# Patient Record
Sex: MALE | Race: OTHER | NOT HISPANIC OR LATINO | URBAN - METROPOLITAN AREA
[De-identification: names, ages, dates, MRNs, and addresses within clinical notes are randomized per-mention and may not be internally consistent; named-entity substitution may affect disease eponyms.]

---

## 2021-07-04 ENCOUNTER — INPATIENT (INPATIENT)
Facility: HOSPITAL | Age: 41
LOS: 3 days | Discharge: HOME | DRG: 520 | End: 2021-07-08
Attending: NEUROLOGICAL SURGERY | Admitting: NEUROLOGICAL SURGERY
Payer: COMMERCIAL

## 2021-07-04 VITALS
TEMPERATURE: 98 F | HEIGHT: 74 IN | RESPIRATION RATE: 18 BRPM | SYSTOLIC BLOOD PRESSURE: 139 MMHG | WEIGHT: 149.91 LBS | OXYGEN SATURATION: 98 % | HEART RATE: 83 BPM | DIASTOLIC BLOOD PRESSURE: 85 MMHG

## 2021-07-04 DIAGNOSIS — R63.8 OTHER SYMPTOMS AND SIGNS CONCERNING FOOD AND FLUID INTAKE: ICD-10-CM

## 2021-07-04 DIAGNOSIS — F32.9 MAJOR DEPRESSIVE DISORDER, SINGLE EPISODE, UNSPECIFIED: ICD-10-CM

## 2021-07-04 DIAGNOSIS — E03.9 HYPOTHYROIDISM, UNSPECIFIED: ICD-10-CM

## 2021-07-04 DIAGNOSIS — G43.909 MIGRAINE, UNSPECIFIED, NOT INTRACTABLE, WITHOUT STATUS MIGRAINOSUS: ICD-10-CM

## 2021-07-04 DIAGNOSIS — M79.606 PAIN IN LEG, UNSPECIFIED: ICD-10-CM

## 2021-07-04 LAB
ALBUMIN SERPL ELPH-MCNC: 4 G/DL — SIGNIFICANT CHANGE UP (ref 3.4–5)
ALP SERPL-CCNC: 57 U/L — SIGNIFICANT CHANGE UP (ref 40–120)
ALT FLD-CCNC: 18 U/L — SIGNIFICANT CHANGE UP (ref 12–42)
ANION GAP SERPL CALC-SCNC: 11 MMOL/L — SIGNIFICANT CHANGE UP (ref 9–16)
AST SERPL-CCNC: 15 U/L — SIGNIFICANT CHANGE UP (ref 15–37)
BASOPHILS # BLD AUTO: 0.05 K/UL — SIGNIFICANT CHANGE UP (ref 0–0.2)
BASOPHILS NFR BLD AUTO: 0.8 % — SIGNIFICANT CHANGE UP (ref 0–2)
BILIRUB SERPL-MCNC: 0.8 MG/DL — SIGNIFICANT CHANGE UP (ref 0.2–1.2)
BUN SERPL-MCNC: 19 MG/DL — SIGNIFICANT CHANGE UP (ref 7–23)
CALCIUM SERPL-MCNC: 9.2 MG/DL — SIGNIFICANT CHANGE UP (ref 8.5–10.5)
CHLORIDE SERPL-SCNC: 105 MMOL/L — SIGNIFICANT CHANGE UP (ref 96–108)
CO2 SERPL-SCNC: 25 MMOL/L — SIGNIFICANT CHANGE UP (ref 22–31)
CREAT SERPL-MCNC: 1.09 MG/DL — SIGNIFICANT CHANGE UP (ref 0.5–1.3)
EOSINOPHIL # BLD AUTO: 0.19 K/UL — SIGNIFICANT CHANGE UP (ref 0–0.5)
EOSINOPHIL NFR BLD AUTO: 3 % — SIGNIFICANT CHANGE UP (ref 0–6)
GLUCOSE SERPL-MCNC: 93 MG/DL — SIGNIFICANT CHANGE UP (ref 70–99)
HCT VFR BLD CALC: 42.5 % — SIGNIFICANT CHANGE UP (ref 39–50)
HGB BLD-MCNC: 14.9 G/DL — SIGNIFICANT CHANGE UP (ref 13–17)
IMM GRANULOCYTES NFR BLD AUTO: 0.3 % — SIGNIFICANT CHANGE UP (ref 0–1.5)
LYMPHOCYTES # BLD AUTO: 2.24 K/UL — SIGNIFICANT CHANGE UP (ref 1–3.3)
LYMPHOCYTES # BLD AUTO: 35.2 % — SIGNIFICANT CHANGE UP (ref 13–44)
MCHC RBC-ENTMCNC: 29.4 PG — SIGNIFICANT CHANGE UP (ref 27–34)
MCHC RBC-ENTMCNC: 35.1 GM/DL — SIGNIFICANT CHANGE UP (ref 32–36)
MCV RBC AUTO: 84 FL — SIGNIFICANT CHANGE UP (ref 80–100)
MONOCYTES # BLD AUTO: 0.47 K/UL — SIGNIFICANT CHANGE UP (ref 0–0.9)
MONOCYTES NFR BLD AUTO: 7.4 % — SIGNIFICANT CHANGE UP (ref 2–14)
NEUTROPHILS # BLD AUTO: 3.39 K/UL — SIGNIFICANT CHANGE UP (ref 1.8–7.4)
NEUTROPHILS NFR BLD AUTO: 53.3 % — SIGNIFICANT CHANGE UP (ref 43–77)
NRBC # BLD: 0 /100 WBCS — SIGNIFICANT CHANGE UP (ref 0–0)
PLATELET # BLD AUTO: 284 K/UL — SIGNIFICANT CHANGE UP (ref 150–400)
POTASSIUM SERPL-MCNC: 4.2 MMOL/L — SIGNIFICANT CHANGE UP (ref 3.5–5.3)
POTASSIUM SERPL-SCNC: 4.2 MMOL/L — SIGNIFICANT CHANGE UP (ref 3.5–5.3)
PROT SERPL-MCNC: 7.4 G/DL — SIGNIFICANT CHANGE UP (ref 6.4–8.2)
RBC # BLD: 5.06 M/UL — SIGNIFICANT CHANGE UP (ref 4.2–5.8)
RBC # FLD: 12.1 % — SIGNIFICANT CHANGE UP (ref 10.3–14.5)
SARS-COV-2 RNA SPEC QL NAA+PROBE: SIGNIFICANT CHANGE UP
SODIUM SERPL-SCNC: 141 MMOL/L — SIGNIFICANT CHANGE UP (ref 132–145)
WBC # BLD: 6.36 K/UL — SIGNIFICANT CHANGE UP (ref 3.8–10.5)
WBC # FLD AUTO: 6.36 K/UL — SIGNIFICANT CHANGE UP (ref 3.8–10.5)

## 2021-07-04 PROCEDURE — 72131 CT LUMBAR SPINE W/O DYE: CPT | Mod: 26

## 2021-07-04 PROCEDURE — 99223 1ST HOSP IP/OBS HIGH 75: CPT | Mod: GC

## 2021-07-04 PROCEDURE — 99285 EMERGENCY DEPT VISIT HI MDM: CPT

## 2021-07-04 PROCEDURE — 72192 CT PELVIS W/O DYE: CPT | Mod: 26

## 2021-07-04 PROCEDURE — 93010 ELECTROCARDIOGRAM REPORT: CPT

## 2021-07-04 RX ORDER — KETOROLAC TROMETHAMINE 30 MG/ML
15 SYRINGE (ML) INJECTION ONCE
Refills: 0 | Status: DISCONTINUED | OUTPATIENT
Start: 2021-07-04 | End: 2021-07-04

## 2021-07-04 RX ORDER — SUMATRIPTAN SUCCINATE 4 MG/.5ML
6 INJECTION, SOLUTION SUBCUTANEOUS ONCE
Refills: 0 | Status: COMPLETED | OUTPATIENT
Start: 2021-07-04 | End: 2021-07-04

## 2021-07-04 RX ORDER — LEVOTHYROXINE SODIUM 125 MCG
1 TABLET ORAL
Qty: 0 | Refills: 0 | DISCHARGE

## 2021-07-04 RX ORDER — DIAZEPAM 5 MG
5 TABLET ORAL ONCE
Refills: 0 | Status: DISCONTINUED | OUTPATIENT
Start: 2021-07-04 | End: 2021-07-04

## 2021-07-04 RX ORDER — DEXAMETHASONE 0.5 MG/5ML
10 ELIXIR ORAL ONCE
Refills: 0 | Status: COMPLETED | OUTPATIENT
Start: 2021-07-04 | End: 2021-07-04

## 2021-07-04 RX ORDER — TRAMADOL HYDROCHLORIDE 50 MG/1
75 TABLET ORAL EVERY 6 HOURS
Refills: 0 | Status: DISCONTINUED | OUTPATIENT
Start: 2021-07-04 | End: 2021-07-05

## 2021-07-04 RX ORDER — LEVOTHYROXINE SODIUM 125 MCG
50 TABLET ORAL DAILY
Refills: 0 | Status: DISCONTINUED | OUTPATIENT
Start: 2021-07-04 | End: 2021-07-08

## 2021-07-04 RX ORDER — IBUPROFEN 200 MG
600 TABLET ORAL EVERY 6 HOURS
Refills: 0 | Status: DISCONTINUED | OUTPATIENT
Start: 2021-07-04 | End: 2021-07-04

## 2021-07-04 RX ORDER — METHOCARBAMOL 500 MG/1
1500 TABLET, FILM COATED ORAL ONCE
Refills: 0 | Status: COMPLETED | OUTPATIENT
Start: 2021-07-04 | End: 2021-07-04

## 2021-07-04 RX ORDER — OXYCODONE AND ACETAMINOPHEN 5; 325 MG/1; MG/1
1 TABLET ORAL ONCE
Refills: 0 | Status: DISCONTINUED | OUTPATIENT
Start: 2021-07-04 | End: 2021-07-04

## 2021-07-04 RX ORDER — BUPROPION HYDROCHLORIDE 150 MG/1
1 TABLET, EXTENDED RELEASE ORAL
Qty: 0 | Refills: 0 | DISCHARGE

## 2021-07-04 RX ORDER — BUPROPION HYDROCHLORIDE 150 MG/1
150 TABLET, EXTENDED RELEASE ORAL DAILY
Refills: 0 | Status: DISCONTINUED | OUTPATIENT
Start: 2021-07-04 | End: 2021-07-08

## 2021-07-04 RX ORDER — ACETAMINOPHEN 500 MG
650 TABLET ORAL EVERY 4 HOURS
Refills: 0 | Status: DISCONTINUED | OUTPATIENT
Start: 2021-07-04 | End: 2021-07-06

## 2021-07-04 RX ORDER — SUMATRIPTAN SUCCINATE 4 MG/.5ML
1 INJECTION, SOLUTION SUBCUTANEOUS
Qty: 0 | Refills: 0 | DISCHARGE

## 2021-07-04 RX ADMIN — OXYCODONE AND ACETAMINOPHEN 1 TABLET(S): 5; 325 TABLET ORAL at 06:00

## 2021-07-04 RX ADMIN — SUMATRIPTAN SUCCINATE 6 MILLIGRAM(S): 4 INJECTION, SOLUTION SUBCUTANEOUS at 23:25

## 2021-07-04 RX ADMIN — Medication 15 MILLIGRAM(S): at 02:45

## 2021-07-04 RX ADMIN — Medication 5 MILLIGRAM(S): at 02:45

## 2021-07-04 RX ADMIN — Medication 102 MILLIGRAM(S): at 09:36

## 2021-07-04 RX ADMIN — METHOCARBAMOL 1500 MILLIGRAM(S): 500 TABLET, FILM COATED ORAL at 02:45

## 2021-07-04 RX ADMIN — TRAMADOL HYDROCHLORIDE 75 MILLIGRAM(S): 50 TABLET ORAL at 21:03

## 2021-07-04 RX ADMIN — TRAMADOL HYDROCHLORIDE 75 MILLIGRAM(S): 50 TABLET ORAL at 22:00

## 2021-07-04 RX ADMIN — Medication 15 MILLIGRAM(S): at 06:00

## 2021-07-04 NOTE — H&P ADULT - NSHPPHYSICALEXAM_GEN_ALL_CORE
PHYSICAL EXAM:    General: WDWN  HEENT: NC/AT; PERRL, anicteric sclera; MMM  Neck: supple  Cardiovascular: +S1/S2, RRR  Respiratory: CTA B/L; no W/R/R  Gastrointestinal: soft, NT/ND; +BSx4  Extremities: WWP; no edema, clubbing or cyanosis  Vascular: 2+ radial, DP/PT pulses B/L  Neurological: AAOx3; no focal deficits  Psychiatric: pleasant mood and affect  Dermatologic: no appreciable wounds or damage to the skin PHYSICAL EXAM:    General: thin male, olaying in bed nAD  HEENT: NC/AT; PERRL, anicteric sclera; MMM  Neck: supple, no palpable nodules, thyromegaly not appreciated  Cardiovascular: +S1/S2, RRR  Respiratory: CTA B/L; no W/R/R  Gastrointestinal: soft, NT/ND; +BSx4  Extremities: WWP; no edema, clubbing or cyanosis  Vascular: 2+ radial, DP/PT pulses B/L  Neurological: AAOx3; no focal deficits. + straight leg test R LE  Psychiatric: pleasant mood and affect  Dermatologic: no appreciable wounds or damage to the skin

## 2021-07-04 NOTE — ED PROVIDER NOTE - OBJECTIVE STATEMENT
40 yom pw right buttock pain rad to posterior thigh/calf x 2-3 days.  hx of similar pain, was told "sciatica", no recent flare ups.  was moving furniture days prior, no trauma/fall/instrumentation.  no urinary sx, no paresthesia to groin, no weakness to LE.  no prior spinal surgery.

## 2021-07-04 NOTE — H&P ADULT - NSHPSOCIALHISTORY_GEN_ALL_CORE
Work:  Tobacco use:   EtOH use:  Illicit drug use:    Living situation: Tobacco use: x  EtOH use: social 1-2/wk  Illicit drug use:x

## 2021-07-04 NOTE — ED PROVIDER NOTE - PROGRESS NOTE DETAILS
Samantha Scales MD  Patient is more comfortable with lying down on his left side, but is unable to move from this position and is unable to stand or walk.  Motor and sensory remain intact.  no abd tenderness.  moderate tenderness right buttock in area of sciatic notch.  Will add steroids, but patient will need admission for further evaluation and care.  Will call transfer center

## 2021-07-04 NOTE — H&P ADULT - PROBLEM SELECTOR PLAN 1
pt w/ 2 days of acute onset RLE pain w/ + straight leg test likely 2/2 to sciatica. Motor and sensory remain intact. pt denies urinary or GI sx, no paresthesia to groin, no weakness to LE. no prior back or spinal surgeries.   -s/p Ketoralac 15mg ivp x2, 5mg percocet x1, robaxin 1500mg x1, valium 5mg iv, decadron 10mg iv  -c/w tramadol 75mg q6 prn severe, ibuprofen 150mg q6 prn moderate , tylenol 650mg q4 prn mild  -cont to monitor pain and for red flag signs  -PT consult pt w/ 2 days of acute onset RLE pain w/ + straight leg test likely 2/2 to sciatica. Motor and sensory remain intact. pt denies urinary or GI sx, no paresthesia to groin, no weakness to LE. no prior back or spinal surgeries.   -s/p Ketoralac 15mg ivp x2, 5mg percocet x1, robaxin 1500mg x1, valium 5mg iv, decadron 10mg iv  -c/w tramadol 75mg q6 prn severe, ibuprofen 150mg q6 prn moderate , tylenol 650mg q4 prn mild  -cont to monitor pain and for red flag signs  -f/u CT lumbar w.o contrast  -PT consult

## 2021-07-04 NOTE — H&P ADULT - NSHPLABSRESULTS_GEN_ALL_CORE
.  LABS:                         14.9   6.36  )-----------( 284      ( 04 Jul 2021 02:51 )             42.5     07-04    141  |  105  |  19  ----------------------------<  93  4.2   |  25  |  1.09    Ca    9.2      04 Jul 2021 02:51    TPro  7.4  /  Alb  4.0  /  TBili  0.8  /  DBili  x   /  AST  15  /  ALT  18  /  AlkPhos  57  07-04              RADIOLOGY, EKG & ADDITIONAL TESTS: Reviewed.

## 2021-07-04 NOTE — H&P ADULT - ASSESSMENT
pt is a 41yo M w/ pmhx of hypothyroidism (on 50mcg daily levothyroxine), depression (on buproprion 300mg qd), migraines (sumatriptan prn) presenting with 2 days of sharp constant 9/10 R gluteal pain likely 2/2 to sciatica A 41yo M w/ pmhx of hypothyroidism (on 50mcg daily levothyroxine), depression (on buproprion 300mg qd), migraines (sumatriptan prn) presenting with 2 days of sharp constant 9/10 R gluteal pain likely 2/2 to sciatica

## 2021-07-04 NOTE — ED ADULT NURSE NOTE - CHIEF COMPLAINT QUOTE
Pt BIBA for R sciatica pain. Pt D/C from hospital yesterday for pain control. Pt states he is unable to walk today.

## 2021-07-04 NOTE — H&P ADULT - ATTENDING COMMENTS
41yo M w/ PMH of hypothyroidism (on 50mcg daily levothyroxine), depression (on buproprion 300mg qd), migraines (sumatriptan prn) presenting with 2 days of sharp constant 9/10 R gluteal pain radiating down to right calf after lifting. CT Lumbar/Pelvis w/o fracture, +L5/S1 mild disc herniation, no spinal stenosis. Pain likely MSK related 39yo M w/ PMH of hypothyroidism (on 50mcg daily levothyroxine), depression (on buproprion 300mg qd), migraines (sumatriptan prn) presenting with 2 days of sharp constant 9/10 R gluteal pain, intermittently radiating down to right knee after lifting. CT Lumbar/Pelvis wet read w/o fracture, +L5/S1 mild chronic disc herniation, no spinal stenosis.     #R Gluteal pain w/Sciatica: Pain localized on my exam to right buttock/piriformis area. Negative straight leg; pain localized to one area. Denies numbness/tingling, bowel/bladder incontinence. Denies lower back pain, trauma. CT Lumbar spine/Pelvis wet read w/o fracture, +L5/S1 mild chronic disc herniation, no spinal stenosis. Pain likely 2/2 muscle strain/sprain vs piriformis syndrome vs less likely 2/2 herniated disc. s/p decadron 10mg x1 in ED. can c/w medrol dose pack although data limited. c/w pain control; ISTOP in AM; PT eval. 41yo M w/ PMH of hypothyroidism (on 50mcg daily levothyroxine), depression (on buproprion 300mg qd), migraines (sumatriptan prn) presenting with 2 days of sharp constant 9/10 R gluteal pain, intermittently radiating down to right knee after lifting. CT Lumbar/Pelvis wet read w/o fracture, +L5/S1 mild disc herniation, awaiting final read.     #R Gluteal pain w/Sciatica: Pain localized on my exam to right buttock/piriformis area. Negative straight leg; pain localized to one area. Denies numbness/tingling, bowel/bladder incontinence. Denies lower back pain, trauma. CT Lumbar spine/Pelvis wet read w/o fracture, +L5/S1 mild disc herniation, awaiting final read. Pain likely 2/2 muscle strain/sprain vs 2/2 herniated disc vs piriformis syndrome. s/p decadrom in ED, c/w medrol dose pack. c/w pain control; ISTOP in AM; PT eval.

## 2021-07-04 NOTE — ED PROVIDER NOTE - CLINICAL SUMMARY MEDICAL DECISION MAKING FREE TEXT BOX
normal strength, sensation intact, normal rectal tone, no point tenderness to lumbar spine, +tenderness over gluteus satya, will trial of analgesia

## 2021-07-04 NOTE — ED PROVIDER NOTE - PHYSICAL EXAMINATION
Physical Exam  GEN: Awake, alert, non-toxic appearing, NCAT  EYES: full EOMI,  ENT: External inspection normal, normal voice,   HEAD: atraumatic  NECK: FROM neck, supple,   RESP: no tachypnea, no hypoxia, no resp distress,  MSK: no deformity, tenderness to gluteus satya  SKIN: no rash  NEURO: ao x 3, strength 5/5 in R hip/knee/ankle/ehl, normal rectal tone, sensation intact in perineum

## 2021-07-04 NOTE — H&P ADULT - PROBLEM SELECTOR PLAN 5
Fluids: none  Electrolytes: Mg>2, K>4  Nutrition:  No IVF currently needed, replete lytes PRN  Prophylaxis: none  Activity: AAT, OOBTC  GI: none  C: FC  Dispo: Admit to [facility]

## 2021-07-04 NOTE — H&P ADULT - HISTORY OF PRESENT ILLNESS
HPI:     In the ED:  Initial vital signs: T: XX F, HR: XX, BP: XX, R: XX, SpO2: XX% on RA  ED course:   Labs: significant for  Imaging:  CXR:   EKG:   Medications:   Consults: none      HPI: pt is a 41yo M w/ pmhx of hypothyroidism (on 50mcg daily levothyroxine), depression (on buproprion 300mg qd), migraines (sumatriptan prn) presenting with 2 days of sharp constant 9/10 R gluteal pain worse w/ ambulation, improved w rest associated with intermittent numbness on the R lateral thigh.  Patient states hes been seeing a chiropractor and physical therapist for general health maintenance was moving furniture days prior, no trauma/fall/instrumentation. Motor and sensory remain intact. pt denies urinary or GI sx, no paresthesia to groin, no weakness to LE. no prior back or spinal surgeries.     ROS otherwise negative    In the ED:  Initial vital signs: T: 97.8F, HR: 83, BP: 139, R: 18, SpO2: 98% on RA  ED course:   Labs: cbc, bmp unremarkable   CXR: none  EKG: none  Medications: Ketoralac 15mg ivp x2, 5mg percocet x1, robaxin 1500mg x1, valium 5mg iv, decadron 10mg iv   Consults: none      HPI: pt is a 39yo M w/ pmhx of hypothyroidism (on 50mcg daily levothyroxine), depression (on buproprion 300mg qd), migraines (sumatriptan prn) presenting with 2 days of sharp constant 9/10 R gluteal pain worse w/ ambulation, improved w rest associated with intermittent numbness on the R lateral thigh.  Patient states hes been seeing a chiropractor and physical therapist for general health maintenance was moving furniture days prior, no trauma/fall/instrumentation. Motor and sensory remain intact. pt denies urinary or GI sx, no paresthesia to groin, no weakness to LE. no prior back or spinal surgeries.     ROS otherwise negative    In the ED:  Initial vital signs: T: 97.8F, HR: 83, BP: 139, R: 18, SpO2: 98% on RA  Labs: cbc, bmp unremarkable   Medications: Ketoralac 15mg ivp x2, 5mg percocet x1, robaxin 1500mg x1, valium 5mg iv, decadron 10mg iv   Consults: none

## 2021-07-04 NOTE — ED ADULT TRIAGE NOTE - CHIEF COMPLAINT QUOTE
Pt BIBA for R sciatica pain. Pt D/C from hospital yesterday for pain control. Pt states he is unable to walk today. patient pain improved. instructed to f.u pmd, med prescribed

## 2021-07-04 NOTE — ED ADULT NURSE REASSESSMENT NOTE - NS ED NURSE REASSESS COMMENT FT1
Pt complaining of R buttock pain 3/10 when lying down and 9/10 when walking. PT states that when he stood up he had a shooting pain to R buttock and was unable to walk. Pt updated on plan of care. Pending further evaluation.
Pt resting in bed at this time. Pending admission. Will continue to monitor.
Received Pt from previous RN lying on stretcher awake and alert, breathing without issue on RA. IV site is patent. Awaiting dispo.

## 2021-07-05 LAB
ALBUMIN SERPL ELPH-MCNC: 4.4 G/DL — SIGNIFICANT CHANGE UP (ref 3.3–5)
ALP SERPL-CCNC: 54 U/L — SIGNIFICANT CHANGE UP (ref 40–120)
ALT FLD-CCNC: 11 U/L — SIGNIFICANT CHANGE UP (ref 10–45)
ANION GAP SERPL CALC-SCNC: 10 MMOL/L — SIGNIFICANT CHANGE UP (ref 5–17)
APTT BLD: 33.3 SEC — SIGNIFICANT CHANGE UP (ref 27.5–35.5)
AST SERPL-CCNC: 14 U/L — SIGNIFICANT CHANGE UP (ref 10–40)
BASOPHILS # BLD AUTO: 0.06 K/UL — SIGNIFICANT CHANGE UP (ref 0–0.2)
BASOPHILS NFR BLD AUTO: 0.6 % — SIGNIFICANT CHANGE UP (ref 0–2)
BILIRUB SERPL-MCNC: 0.7 MG/DL — SIGNIFICANT CHANGE UP (ref 0.2–1.2)
BLD GP AB SCN SERPL QL: NEGATIVE — SIGNIFICANT CHANGE UP
BUN SERPL-MCNC: 19 MG/DL — SIGNIFICANT CHANGE UP (ref 7–23)
CALCIUM SERPL-MCNC: 9.5 MG/DL — SIGNIFICANT CHANGE UP (ref 8.4–10.5)
CHLORIDE SERPL-SCNC: 103 MMOL/L — SIGNIFICANT CHANGE UP (ref 96–108)
CO2 SERPL-SCNC: 27 MMOL/L — SIGNIFICANT CHANGE UP (ref 22–31)
CREAT SERPL-MCNC: 1.05 MG/DL — SIGNIFICANT CHANGE UP (ref 0.5–1.3)
EOSINOPHIL # BLD AUTO: 0.09 K/UL — SIGNIFICANT CHANGE UP (ref 0–0.5)
EOSINOPHIL NFR BLD AUTO: 0.9 % — SIGNIFICANT CHANGE UP (ref 0–6)
GLUCOSE SERPL-MCNC: 108 MG/DL — HIGH (ref 70–99)
HCT VFR BLD CALC: 41.7 % — SIGNIFICANT CHANGE UP (ref 39–50)
HGB BLD-MCNC: 14.8 G/DL — SIGNIFICANT CHANGE UP (ref 13–17)
HIV 1+2 AB+HIV1 P24 AG SERPL QL IA: SIGNIFICANT CHANGE UP
IMM GRANULOCYTES NFR BLD AUTO: 0.4 % — SIGNIFICANT CHANGE UP (ref 0–1.5)
INR BLD: 1.04 — SIGNIFICANT CHANGE UP (ref 0.88–1.16)
LYMPHOCYTES # BLD AUTO: 3.05 K/UL — SIGNIFICANT CHANGE UP (ref 1–3.3)
LYMPHOCYTES # BLD AUTO: 30.1 % — SIGNIFICANT CHANGE UP (ref 13–44)
MAGNESIUM SERPL-MCNC: 1.8 MG/DL — SIGNIFICANT CHANGE UP (ref 1.6–2.6)
MCHC RBC-ENTMCNC: 29 PG — SIGNIFICANT CHANGE UP (ref 27–34)
MCHC RBC-ENTMCNC: 35.5 GM/DL — SIGNIFICANT CHANGE UP (ref 32–36)
MCV RBC AUTO: 81.6 FL — SIGNIFICANT CHANGE UP (ref 80–100)
MONOCYTES # BLD AUTO: 0.55 K/UL — SIGNIFICANT CHANGE UP (ref 0–0.9)
MONOCYTES NFR BLD AUTO: 5.4 % — SIGNIFICANT CHANGE UP (ref 2–14)
NEUTROPHILS # BLD AUTO: 6.34 K/UL — SIGNIFICANT CHANGE UP (ref 1.8–7.4)
NEUTROPHILS NFR BLD AUTO: 62.6 % — SIGNIFICANT CHANGE UP (ref 43–77)
NRBC # BLD: 0 /100 WBCS — SIGNIFICANT CHANGE UP (ref 0–0)
PHOSPHATE SERPL-MCNC: 2.9 MG/DL — SIGNIFICANT CHANGE UP (ref 2.5–4.5)
PLATELET # BLD AUTO: 292 K/UL — SIGNIFICANT CHANGE UP (ref 150–400)
POTASSIUM SERPL-MCNC: 4.5 MMOL/L — SIGNIFICANT CHANGE UP (ref 3.5–5.3)
POTASSIUM SERPL-SCNC: 4.5 MMOL/L — SIGNIFICANT CHANGE UP (ref 3.5–5.3)
PROT SERPL-MCNC: 7.6 G/DL — SIGNIFICANT CHANGE UP (ref 6–8.3)
PROTHROM AB SERPL-ACNC: 12.5 SEC — SIGNIFICANT CHANGE UP (ref 10.6–13.6)
RBC # BLD: 5.11 M/UL — SIGNIFICANT CHANGE UP (ref 4.2–5.8)
RBC # FLD: 12 % — SIGNIFICANT CHANGE UP (ref 10.3–14.5)
RH IG SCN BLD-IMP: POSITIVE — SIGNIFICANT CHANGE UP
SODIUM SERPL-SCNC: 140 MMOL/L — SIGNIFICANT CHANGE UP (ref 135–145)
TSH SERPL-MCNC: 5.8 UIU/ML — HIGH (ref 0.27–4.2)
WBC # BLD: 10.13 K/UL — SIGNIFICANT CHANGE UP (ref 3.8–10.5)
WBC # FLD AUTO: 10.13 K/UL — SIGNIFICANT CHANGE UP (ref 3.8–10.5)

## 2021-07-05 PROCEDURE — 99221 1ST HOSP IP/OBS SF/LOW 40: CPT

## 2021-07-05 PROCEDURE — 93010 ELECTROCARDIOGRAM REPORT: CPT

## 2021-07-05 PROCEDURE — 72158 MRI LUMBAR SPINE W/O & W/DYE: CPT | Mod: 26

## 2021-07-05 PROCEDURE — 99232 SBSQ HOSP IP/OBS MODERATE 35: CPT

## 2021-07-05 RX ORDER — MORPHINE SULFATE 50 MG/1
2 CAPSULE, EXTENDED RELEASE ORAL ONCE
Refills: 0 | Status: DISCONTINUED | OUTPATIENT
Start: 2021-07-05 | End: 2021-07-05

## 2021-07-05 RX ORDER — DEXAMETHASONE 0.5 MG/5ML
10 ELIXIR ORAL ONCE
Refills: 0 | Status: COMPLETED | OUTPATIENT
Start: 2021-07-05 | End: 2021-07-05

## 2021-07-05 RX ORDER — DIAZEPAM 5 MG
5 TABLET ORAL ONCE
Refills: 0 | Status: DISCONTINUED | OUTPATIENT
Start: 2021-07-05 | End: 2021-07-05

## 2021-07-05 RX ORDER — SENNA PLUS 8.6 MG/1
1 TABLET ORAL DAILY
Refills: 0 | Status: DISCONTINUED | OUTPATIENT
Start: 2021-07-05 | End: 2021-07-07

## 2021-07-05 RX ORDER — POLYETHYLENE GLYCOL 3350 17 G/17G
17 POWDER, FOR SOLUTION ORAL DAILY
Refills: 0 | Status: DISCONTINUED | OUTPATIENT
Start: 2021-07-05 | End: 2021-07-08

## 2021-07-05 RX ORDER — MORPHINE SULFATE 50 MG/1
2 CAPSULE, EXTENDED RELEASE ORAL EVERY 6 HOURS
Refills: 0 | Status: DISCONTINUED | OUTPATIENT
Start: 2021-07-05 | End: 2021-07-06

## 2021-07-05 RX ORDER — TRAMADOL HYDROCHLORIDE 50 MG/1
75 TABLET ORAL EVERY 6 HOURS
Refills: 0 | Status: DISCONTINUED | OUTPATIENT
Start: 2021-07-05 | End: 2021-07-06

## 2021-07-05 RX ORDER — CYCLOBENZAPRINE HYDROCHLORIDE 10 MG/1
5 TABLET, FILM COATED ORAL THREE TIMES A DAY
Refills: 0 | Status: DISCONTINUED | OUTPATIENT
Start: 2021-07-05 | End: 2021-07-06

## 2021-07-05 RX ADMIN — TRAMADOL HYDROCHLORIDE 75 MILLIGRAM(S): 50 TABLET ORAL at 17:20

## 2021-07-05 RX ADMIN — BUPROPION HYDROCHLORIDE 150 MILLIGRAM(S): 150 TABLET, EXTENDED RELEASE ORAL at 08:57

## 2021-07-05 RX ADMIN — POLYETHYLENE GLYCOL 3350 17 GRAM(S): 17 POWDER, FOR SOLUTION ORAL at 11:00

## 2021-07-05 RX ADMIN — CYCLOBENZAPRINE HYDROCHLORIDE 5 MILLIGRAM(S): 10 TABLET, FILM COATED ORAL at 06:45

## 2021-07-05 RX ADMIN — Medication 102 MILLIGRAM(S): at 17:53

## 2021-07-05 RX ADMIN — MORPHINE SULFATE 2 MILLIGRAM(S): 50 CAPSULE, EXTENDED RELEASE ORAL at 13:45

## 2021-07-05 RX ADMIN — TRAMADOL HYDROCHLORIDE 75 MILLIGRAM(S): 50 TABLET ORAL at 12:03

## 2021-07-05 RX ADMIN — TRAMADOL HYDROCHLORIDE 75 MILLIGRAM(S): 50 TABLET ORAL at 16:42

## 2021-07-05 RX ADMIN — TRAMADOL HYDROCHLORIDE 75 MILLIGRAM(S): 50 TABLET ORAL at 06:20

## 2021-07-05 RX ADMIN — MORPHINE SULFATE 2 MILLIGRAM(S): 50 CAPSULE, EXTENDED RELEASE ORAL at 13:28

## 2021-07-05 RX ADMIN — MORPHINE SULFATE 2 MILLIGRAM(S): 50 CAPSULE, EXTENDED RELEASE ORAL at 15:29

## 2021-07-05 RX ADMIN — Medication 5 MILLIGRAM(S): at 13:53

## 2021-07-05 RX ADMIN — Medication 50 MICROGRAM(S): at 05:08

## 2021-07-05 RX ADMIN — TRAMADOL HYDROCHLORIDE 75 MILLIGRAM(S): 50 TABLET ORAL at 11:01

## 2021-07-05 RX ADMIN — SENNA PLUS 1 TABLET(S): 8.6 TABLET ORAL at 11:01

## 2021-07-05 RX ADMIN — SUMATRIPTAN SUCCINATE 6 MILLIGRAM(S): 4 INJECTION, SOLUTION SUBCUTANEOUS at 00:15

## 2021-07-05 RX ADMIN — TRAMADOL HYDROCHLORIDE 75 MILLIGRAM(S): 50 TABLET ORAL at 05:08

## 2021-07-05 RX ADMIN — MORPHINE SULFATE 2 MILLIGRAM(S): 50 CAPSULE, EXTENDED RELEASE ORAL at 13:54

## 2021-07-05 NOTE — PHYSICAL THERAPY INITIAL EVALUATION ADULT - PERTINENT HX OF CURRENT PROBLEM, REHAB EVAL
HPI: pt is a 41yo M w/ pmhx of hypothyroidism (on 50mcg daily levothyroxine), depression (on buproprion 300mg qd), migraines (sumatriptan prn) presenting with 2 days of sharp constant 9/10 R gluteal pain worse w/ ambulation, improved w rest associated with intermittent numbness on the R lateral thigh.  pt denies urinary or GI sx, no paresthesia to groin, no weakness to LE. no prior back or spinal surgeries. HPI: pt is a 41yo M w/ pmhx of hypothyroidism,  depression, , migraines  presenting with 2 days of sharp constant 9/10 R gluteal pain worse w/ ambulation, improved w rest associated with intermittent numbness on the R lateral thigh.  pt denies urinary or GI sx, no paresthesia to groin, no weakness to LE. no prior back or spinal surgeries.  At the L5/S1 level, there is disc bulge   minimum disc bulges at the L1/2 through the L4/5

## 2021-07-05 NOTE — PROGRESS NOTE ADULT - SUBJECTIVE AND OBJECTIVE BOX
INTERVAL HPI/OVERNIGHT EVENTS:      VITAL SIGNS:  T(F): 98.1 (07-05-21 @ 06:25)  HR: 66 (07-05-21 @ 06:25)  BP: 104/63 (07-05-21 @ 06:25)  RR: 18 (07-05-21 @ 06:25)  SpO2: 98% (07-05-21 @ 06:25)  Wt(kg): --    PHYSICAL EXAM:      MEDICATIONS  (STANDING):  buPROPion XL (24-Hour) . 150 milliGRAM(s) Oral daily  levothyroxine 50 MICROGram(s) Oral daily  methylPREDNISolone   Oral   methylPREDNISolone 24 milliGRAM(s) Oral once  polyethylene glycol 3350 17 Gram(s) Oral daily  senna 1 Tablet(s) Oral daily    MEDICATIONS  (PRN):  acetaminophen   Tablet .. 650 milliGRAM(s) Oral every 4 hours PRN Mild Pain (1 - 3)  cyclobenzaprine 5 milliGRAM(s) Oral three times a day PRN Muscle Spasm  morphine  - Injectable 2 milliGRAM(s) IV Push every 6 hours PRN Severe Pain (7 - 10)  traMADol 75 milliGRAM(s) Oral every 6 hours PRN Moderate Pain (4 - 6)      Allergies    No Known Allergies    Intolerances        LABS:                        14.9   6.36  )-----------( 284      ( 04 Jul 2021 02:51 )             42.5     07-04    141  |  105  |  19  ----------------------------<  93  4.2   |  25  |  1.09    Ca    9.2      04 Jul 2021 02:51    TPro  7.4  /  Alb  4.0  /  TBili  0.8  /  DBili  x   /  AST  15  /  ALT  18  /  AlkPhos  57  07-04            RADIOLOGY & ADDITIONAL TESTS:  Reviewed INTERVAL HPI/OVERNIGHT EVENTS:    Patient reporting severe 10/10 R gluteal pain that radiates to upper thigh with numbness of upper lateral thigh. Pain started two days ago and progressively worsening, worse when walks, most relieved with sitting.  Patient denies n/v, cp/sb, abdominal discomfort, dyuria,.      VITAL SIGNS:  T(F): 98.1 (07-05-21 @ 06:25)  HR: 66 (07-05-21 @ 06:25)  BP: 104/63 (07-05-21 @ 06:25)  RR: 18 (07-05-21 @ 06:25)  SpO2: 98% (07-05-21 @ 06:25)  Wt(kg): --    PHYSICAL EXAM:      MEDICATIONS  (STANDING):  buPROPion XL (24-Hour) . 150 milliGRAM(s) Oral daily  levothyroxine 50 MICROGram(s) Oral daily  methylPREDNISolone   Oral   methylPREDNISolone 24 milliGRAM(s) Oral once  polyethylene glycol 3350 17 Gram(s) Oral daily  senna 1 Tablet(s) Oral daily    MEDICATIONS  (PRN):  acetaminophen   Tablet .. 650 milliGRAM(s) Oral every 4 hours PRN Mild Pain (1 - 3)  cyclobenzaprine 5 milliGRAM(s) Oral three times a day PRN Muscle Spasm  morphine  - Injectable 2 milliGRAM(s) IV Push every 6 hours PRN Severe Pain (7 - 10)  traMADol 75 milliGRAM(s) Oral every 6 hours PRN Moderate Pain (4 - 6)      Allergies    No Known Allergies    Intolerances        LABS:                        14.9   6.36  )-----------( 284      ( 04 Jul 2021 02:51 )             42.5     07-04    141  |  105  |  19  ----------------------------<  93  4.2   |  25  |  1.09    Ca    9.2      04 Jul 2021 02:51    TPro  7.4  /  Alb  4.0  /  TBili  0.8  /  DBili  x   /  AST  15  /  ALT  18  /  AlkPhos  57  07-04            RADIOLOGY & ADDITIONAL TESTS:  Reviewed INTERVAL HPI/OVERNIGHT EVENTS:    Patient reporting severe 10/10 R gluteal pain that radiates to upper thigh with numbness of upper lateral thigh. Pain started two days ago and progressively worsening, worse when walks, most relieved with sitting.  Patient denies n/v, cp/sb, abdominal discomfort, dyuria,.      VITAL SIGNS:  T(F): 98.1 (07-05-21 @ 06:25)  HR: 66 (07-05-21 @ 06:25)  BP: 104/63 (07-05-21 @ 06:25)  RR: 18 (07-05-21 @ 06:25)  SpO2: 98% (07-05-21 @ 06:25)  Wt(kg): --    PHYSICAL EXAM:  General: sitting in bed, appears to be in mild distress  HEENT: sclera non-icteric, neck supple, trachea midline, no masses, no JVD, MMM  Respiratory: CTA b/l, no wheezing, no rhonchi, no rales, without accessory muscle use and no intercostal retractions  Cardiovascular: RRR, normal S1S2, no M/R/G  Gastrointestinal: soft, NTND, no masses palpable, BS normal  Extremities: Warm, well perfused, pulses equal bilateral upper and lower extremities, no edema, no clubbing. Right LE SL sign positive with pain elicited on rasing leg, nomal range of motion, sensation reduced right lateral upper thigh compared to left, strength 4/5 right leg compared to 5/5 left  Neurological: AAOx3  Skin: Normal temperature, warm, dry    MEDICATIONS  (STANDING):  buPROPion XL (24-Hour) . 150 milliGRAM(s) Oral daily  levothyroxine 50 MICROGram(s) Oral daily  methylPREDNISolone   Oral   methylPREDNISolone 24 milliGRAM(s) Oral once  polyethylene glycol 3350 17 Gram(s) Oral daily  senna 1 Tablet(s) Oral daily    MEDICATIONS  (PRN):  acetaminophen   Tablet .. 650 milliGRAM(s) Oral every 4 hours PRN Mild Pain (1 - 3)  cyclobenzaprine 5 milliGRAM(s) Oral three times a day PRN Muscle Spasm  morphine  - Injectable 2 milliGRAM(s) IV Push every 6 hours PRN Severe Pain (7 - 10)  traMADol 75 milliGRAM(s) Oral every 6 hours PRN Moderate Pain (4 - 6)      Allergies    No Known Allergies    Intolerances        LABS:                        14.9   6.36  )-----------( 284      ( 04 Jul 2021 02:51 )             42.5     07-04    141  |  105  |  19  ----------------------------<  93  4.2   |  25  |  1.09    Ca    9.2      04 Jul 2021 02:51    TPro  7.4  /  Alb  4.0  /  TBili  0.8  /  DBili  x   /  AST  15  /  ALT  18  /  AlkPhos  57  07-04            RADIOLOGY & ADDITIONAL TESTS:  Reviewed

## 2021-07-05 NOTE — PROGRESS NOTE ADULT - ATTENDING COMMENTS
MRI shows Moderate size right paracentral disc herniation at L5/S1 compressing the right S1 nerve root   High dose steroids started  Neurosurgery consulted  Pain management

## 2021-07-05 NOTE — PROGRESS NOTE ADULT - PROBLEM SELECTOR PLAN 1
pt w/ 2 days of acute onset RLE pain w/ + straight leg test likely 2/2 to sciatica. Motor and sensory remain intact. pt denies urinary or GI sx, no paresthesia to groin, no weakness to LE. no prior back or spinal surgeries. CT pelvis and lumbas spine without contrast shows L3-L4, L4-L5, L5-S1: There is disc bulge resulting in minimal spinal canal stenosis and mild bilateral neural foraminal narrowing.  -s/p Ketoralac 15mg ivp x2, 5mg percocet x1, robaxin 1500mg x1, valium 5mg iv, decadron 10mg iv  -c/w tramadol 75mg q6 prn severe, ibuprofen 150mg q6 prn moderate , tylenol 650mg q4 prn mild, patient in severe pain given 4mg morphine and valium, robaxin  -cont to monitor pain and for red flag signs  - FU MRI  - Patient getting methylpred taper  -PT recommends home with home PT but patient continues to be in severe pain, will continue to work up pain pt w/ 2 days of acute onset RLE pain w/ + straight leg test likely 2/2 to sciatica. Motor and sensory remain intact. pt denies urinary or GI sx, no paresthesia to groin, no weakness to LE. no prior back or spinal surgeries. CT pelvis and lumbas spine without contrast shows L3-L4, L4-L5, L5-S1: There is disc bulge resulting in minimal spinal canal stenosis and mild bilateral neural foraminal narrowing.  -s/p Ketoralac 15mg ivp x2, 5mg percocet x1, robaxin 1500mg x1, valium 5mg iv, decadron 10mg iv  -c/w tramadol 75mg q6 prn severe, ibuprofen 150mg q6 prn moderate , tylenol 650mg q4 prn mild, patient in severe pain given 4mg morphine and valium, robaxin  -cont to monitor pain and for red flag signs  - MRI shows Moderate size right paracentral disc herniation at L5/S1 compressing the right S1 nerve root   - High dose steroids started  -PT recommends home with home PT but patient continues to be in severe pain, will continue to work up pain

## 2021-07-05 NOTE — PROGRESS NOTE ADULT - PROBLEM SELECTOR PLAN 4
-c/w buproprion 150mg qd (increase back to home dose (300mg QD) -c/w buproprion 150mg qd (increase back to home dose (300mg QD)    #Pre-op clearance  Patient found to have S1 nerve root compression on MRI. [facility] consulted. s/p 10mg IV decadron. likely will need surgical intervention. Pt w/ METS >4, RCRI Class I. Patient deemed low risk for intermediate risk procedure.  EKG with...

## 2021-07-05 NOTE — CONSULT NOTE ADULT - SUBJECTIVE AND OBJECTIVE BOX
HISTORY OF PRESENT ILLNESS:   41 y/o male with pmh hypothyroidism and depression presents with severe right gluteal pain radiating down his RLE x 3 days. Patient reports that he has had intermittent radiating pain from b/l buttocks into his lower extremities with certain activities since childhood, but it has worsened over the past 2-3 years. Patient reports that he was moving heavy boxes 2 days ago when symptoms in the RLE acutely worsened. He also admits to numbness to posterior R thigh.  Patient was given 10mg decadron, valium, robaxin and toradol at [facility] ER with some relief, CT showed S1 disc bulging, and he was transferred to [facility] for workup. MRI was significant for S1 nerve root compression by L5-S1 disc herniation. Patient denies trauma, prior spine surgery, saddle anesthesia and bowel or bladder incontinence.    PAST MEDICAL & SURGICAL HISTORY:  Hypothyroidism    Migraine    Depression      FAMILY HISTORY:      SOCIAL HISTORY:  Tobacco Use:  EtOH use:   Substance:    Allergies    No Known Allergies    Intolerances        REVIEW OF SYSTEMS      General:	no recent illnesses, no recent wt gain/loss    Skin/Breast:  intact  	  Ophthalmologic:  negative  	  ENMT:	negative    Respiratory and Thorax: no coughing, wheezing, recent URI  	  Cardiovascular: no chest pain, ARAIZA    Gastrointestinal:	soft, non tender    Genitourinary: no frequency, dysuria    Musculoskeletal:	negative    Neurological:	see HPI    Psychiatric:	negative    Hematology/Lymphatics:	negative    Endocrine:  	negative    Allergic/Immunologic:  Negative      MEDICATIONS:  Antibiotics:    Neuro:  acetaminophen   Tablet .. 650 milliGRAM(s) Oral every 4 hours PRN  buPROPion XL (24-Hour) . 150 milliGRAM(s) Oral daily  cyclobenzaprine 5 milliGRAM(s) Oral three times a day PRN  morphine  - Injectable 2 milliGRAM(s) IV Push every 6 hours PRN  traMADol 75 milliGRAM(s) Oral every 6 hours PRN    Anticoagulation:    OTHER:  levothyroxine 50 MICROGram(s) Oral daily  polyethylene glycol 3350 17 Gram(s) Oral daily  senna 1 Tablet(s) Oral daily    IVF:      Vital Signs Last 24 Hrs  T(C): 36.7 (05 Jul 2021 15:33), Max: 36.7 (05 Jul 2021 06:25)  T(F): 98.1 (05 Jul 2021 15:33), Max: 98.1 (05 Jul 2021 06:25)  HR: 85 (05 Jul 2021 15:33) (66 - 87)  BP: 123/77 (05 Jul 2021 15:33) (104/63 - 123/77)  BP(mean): --  RR: 17 (05 Jul 2021 15:33) (17 - 18)  SpO2: 98% (05 Jul 2021 15:33) (98% - 98%)    PHYSICAL EXAM:  General: patient seen laying supine in bed in NAD  Neuro: AAOx3, FC, OE spontaneously, speech clear and fluent, LUE/RUE 5/5, RLE HF/KF/KE 4/5 EHL/DF/PF 5/5 pain limited, LLE HF 4/5  KF/KE/DF/PF/EHL 5/5 pain limited  HEENT: PERRL, EOMI  Neck: supple  Cardiac: RRR, S1S2  Pulmonary: chest rise symmetric  Abdomen: soft, nontender, nondistended  Ext: warm, perfusing well        LABS:                        14.9   6.36  )-----------( 284      ( 04 Jul 2021 02:51 )             42.5     07-04    141  |  105  |  19  ----------------------------<  93  4.2   |  25  |  1.09    Ca    9.2      04 Jul 2021 02:51    TPro  7.4  /  Alb  4.0  /  TBili  0.8  /  DBili  x   /  AST  15  /  ALT  18  /  AlkPhos  57  07-04        CULTURES:      RADIOLOGY & ADDITIONAL STUDIES:  < from: MR Lumbar Spine w/wo IV Cont (07.05.21 @ 15:29) >  IMPRESSION: Mild loss of height involving L2 which may be chronic. Moderate size right paracentral disc herniation at L5/S1 compressing the right S1 nerve root and correlation with the patient's clinical symptoms is recommended.    < end of copied text >    Assessment:  41 y/o male presenting with severe RLE radicular pain found to have S1 nerve root compression by L5-S1 disc herniation.     Plan:  - patient is a surgical candidate, will discuss possibility of surgery with patient tonight  - pain control    D/w Dr. Schulz   HISTORY OF PRESENT ILLNESS:   39 y/o male with pmh hypothyroidism and depression presents with severe right gluteal pain radiating down his RLE x 3 days. Patient reports that he has had intermittent radiating pain from b/l buttocks into his lower extremities with certain activities since childhood, but it has worsened over the past 2-3 years. Patient reports that he was moving heavy boxes 2 days ago when symptoms in the RLE acutely worsened. He also admits to numbness to posterior R thigh.  Patient was given 10mg decadron, valium, robaxin and toradol at [facility] ER with some relief, CT showed S1 disc bulging, and he was transferred to [facility] for workup. MRI was significant for S1 nerve root compression by L5-S1 disc herniation. Patient denies trauma, prior spine surgery, saddle anesthesia and bowel or bladder incontinence.    PAST MEDICAL & SURGICAL HISTORY:  Hypothyroidism    Migraine    Depression      FAMILY HISTORY:      SOCIAL HISTORY:  Tobacco Use:  EtOH use:   Substance:    Allergies    No Known Allergies    Intolerances        REVIEW OF SYSTEMS      General:	no recent illnesses, no recent wt gain/loss    Skin/Breast:  intact  	  Ophthalmologic:  negative  	  ENMT:	negative    Respiratory and Thorax: no coughing, wheezing, recent URI  	  Cardiovascular: no chest pain, ARAIZA    Gastrointestinal:	soft, non tender    Genitourinary: no frequency, dysuria    Musculoskeletal:	negative    Neurological:	see HPI    Psychiatric:	negative    Hematology/Lymphatics:	negative    Endocrine:  	negative    Allergic/Immunologic:  Negative      MEDICATIONS:  Antibiotics:    Neuro:  acetaminophen   Tablet .. 650 milliGRAM(s) Oral every 4 hours PRN  buPROPion XL (24-Hour) . 150 milliGRAM(s) Oral daily  cyclobenzaprine 5 milliGRAM(s) Oral three times a day PRN  morphine  - Injectable 2 milliGRAM(s) IV Push every 6 hours PRN  traMADol 75 milliGRAM(s) Oral every 6 hours PRN    Anticoagulation:    OTHER:  levothyroxine 50 MICROGram(s) Oral daily  polyethylene glycol 3350 17 Gram(s) Oral daily  senna 1 Tablet(s) Oral daily    IVF:      Vital Signs Last 24 Hrs  T(C): 36.7 (05 Jul 2021 15:33), Max: 36.7 (05 Jul 2021 06:25)  T(F): 98.1 (05 Jul 2021 15:33), Max: 98.1 (05 Jul 2021 06:25)  HR: 85 (05 Jul 2021 15:33) (66 - 87)  BP: 123/77 (05 Jul 2021 15:33) (104/63 - 123/77)  BP(mean): --  RR: 17 (05 Jul 2021 15:33) (17 - 18)  SpO2: 98% (05 Jul 2021 15:33) (98% - 98%)    PHYSICAL EXAM:  General: patient seen laying supine in bed in NAD  Neuro: AAOx3, FC, OE spontaneously, speech clear and fluent, LUE/RUE 5/5, RLE HF/KF/KE 4/5 EHL/DF/PF 5/5 pain limited, LLE HF 4/5  KF/KE/DF/PF/EHL 5/5 pain limited  HEENT: PERRL, EOMI  Neck: supple  Cardiac: RRR, S1S2  Pulmonary: chest rise symmetric  Abdomen: soft, nontender, nondistended  Ext: warm, perfusing well        LABS:                        14.9   6.36  )-----------( 284      ( 04 Jul 2021 02:51 )             42.5     07-04    141  |  105  |  19  ----------------------------<  93  4.2   |  25  |  1.09    Ca    9.2      04 Jul 2021 02:51    TPro  7.4  /  Alb  4.0  /  TBili  0.8  /  DBili  x   /  AST  15  /  ALT  18  /  AlkPhos  57  07-04        CULTURES:      RADIOLOGY & ADDITIONAL STUDIES:  < from: MR Lumbar Spine w/wo IV Cont (07.05.21 @ 15:29) >  IMPRESSION: Mild loss of height involving L2 which may be chronic. Moderate size right paracentral disc herniation at L5/S1 compressing the right S1 nerve root and correlation with the patient's clinical symptoms is recommended.    < end of copied text >    Assessment:  39 y/o male presenting with severe RLE radicular pain found to have S1 nerve root compression by L5-S1 disc herniation.     Plan:  - patient is a candidate for a right L5-S1 microdiscectomy,  will discuss possibility of surgery with patient tonight  - pain control    D/w Dr. Schulz  Please call neurosurgery PA with any questions or concerns: [phone]   HISTORY OF PRESENT ILLNESS:   41 y/o male with pmh hypothyroidism and depression presents with severe right gluteal pain radiating down his RLE x 3 days. Patient reports that he has had intermittent radiating pain from b/l buttocks into his lower extremities with certain activities since childhood, but it has worsened over the past 2-3 years. Patient reports that he was moving heavy boxes 2 days ago when symptoms in the RLE acutely worsened. He also admits to numbness to posterior R thigh.  Patient was given 10mg decadron, valium, robaxin and toradol at [facility] ER with some relief, CT showed S1 disc bulging, and he was transferred to [facility] for workup. MRI was significant for S1 nerve root compression by L5-S1 disc herniation. Patient denies trauma, prior spine surgery, saddle anesthesia and bowel or bladder incontinence.    PAST MEDICAL & SURGICAL HISTORY:  Hypothyroidism    Migraine    Depression      FAMILY HISTORY:      SOCIAL HISTORY:  Tobacco Use:  EtOH use:   Substance:    Allergies    No Known Allergies    Intolerances        REVIEW OF SYSTEMS      General:	no recent illnesses, no recent wt gain/loss    Skin/Breast:  intact  	  Ophthalmologic:  negative  	  ENMT:	negative    Respiratory and Thorax: no coughing, wheezing, recent URI  	  Cardiovascular: no chest pain, ARAIZA    Gastrointestinal:	soft, non tender    Genitourinary: no frequency, dysuria    Musculoskeletal:	negative    Neurological:	see HPI    Psychiatric:	negative    Hematology/Lymphatics:	negative    Endocrine:  	negative    Allergic/Immunologic:  Negative      MEDICATIONS:  Antibiotics:    Neuro:  acetaminophen   Tablet .. 650 milliGRAM(s) Oral every 4 hours PRN  buPROPion XL (24-Hour) . 150 milliGRAM(s) Oral daily  cyclobenzaprine 5 milliGRAM(s) Oral three times a day PRN  morphine  - Injectable 2 milliGRAM(s) IV Push every 6 hours PRN  traMADol 75 milliGRAM(s) Oral every 6 hours PRN    Anticoagulation:    OTHER:  levothyroxine 50 MICROGram(s) Oral daily  polyethylene glycol 3350 17 Gram(s) Oral daily  senna 1 Tablet(s) Oral daily    IVF:      Vital Signs Last 24 Hrs  T(C): 36.7 (05 Jul 2021 15:33), Max: 36.7 (05 Jul 2021 06:25)  T(F): 98.1 (05 Jul 2021 15:33), Max: 98.1 (05 Jul 2021 06:25)  HR: 85 (05 Jul 2021 15:33) (66 - 87)  BP: 123/77 (05 Jul 2021 15:33) (104/63 - 123/77)  BP(mean): --  RR: 17 (05 Jul 2021 15:33) (17 - 18)  SpO2: 98% (05 Jul 2021 15:33) (98% - 98%)    PHYSICAL EXAM:  General: patient seen laying supine in bed in NAD  Neuro: AAOx3, FC, OE spontaneously, speech clear and fluent, LUE/RUE 5/5, RLE HF/KF/KE 4/5 EHL/DF/PF 5/5 pain limited, LLE HF 4/5  KF/KE/DF/PF/EHL 5/5 pain limited, decreased sensation to light touch to RLE S1 distribution in thigh  HEENT: PERRL, EOMI  Neck: supple  Cardiac: RRR, S1S2  Pulmonary: chest rise symmetric  Abdomen: soft, nontender, nondistended  Ext: warm, perfusing well        LABS:                        14.9   6.36  )-----------( 284      ( 04 Jul 2021 02:51 )             42.5     07-04    141  |  105  |  19  ----------------------------<  93  4.2   |  25  |  1.09    Ca    9.2      04 Jul 2021 02:51    TPro  7.4  /  Alb  4.0  /  TBili  0.8  /  DBili  x   /  AST  15  /  ALT  18  /  AlkPhos  57  07-04        CULTURES:      RADIOLOGY & ADDITIONAL STUDIES:  < from: MR Lumbar Spine w/wo IV Cont (07.05.21 @ 15:29) >  IMPRESSION: Mild loss of height involving L2 which may be chronic. Moderate size right paracentral disc herniation at L5/S1 compressing the right S1 nerve root and correlation with the patient's clinical symptoms is recommended.    < end of copied text >    Assessment:  41 y/o male presenting with severe RLE radicular pain found to have S1 nerve root compression by L5-S1 disc herniation.     Plan:  - patient is a candidate for a right L5-S1 microdiscectomy,  will discuss possibility of surgery with patient tonight  - pain control    D/w Dr. Schulz  Please call neurosurgery PA with any questions or concerns: [phone]

## 2021-07-05 NOTE — PHYSICAL THERAPY INITIAL EVALUATION ADULT - MANUAL MUSCLE TESTING RESULTS, REHAB EVAL
except right hip 2/2 pian with AROM  (AROM and strength are WFL)/no strength deficits were identified

## 2021-07-06 DIAGNOSIS — E03.9 HYPOTHYROIDISM, UNSPECIFIED: ICD-10-CM

## 2021-07-06 DIAGNOSIS — Z01.818 ENCOUNTER FOR OTHER PREPROCEDURAL EXAMINATION: ICD-10-CM

## 2021-07-06 DIAGNOSIS — M51.16 INTERVERTEBRAL DISC DISORDERS WITH RADICULOPATHY, LUMBAR REGION: ICD-10-CM

## 2021-07-06 DIAGNOSIS — F32.9 MAJOR DEPRESSIVE DISORDER, SINGLE EPISODE, UNSPECIFIED: ICD-10-CM

## 2021-07-06 DIAGNOSIS — Z86.69 PERSONAL HISTORY OF OTHER DISEASES OF THE NERVOUS SYSTEM AND SENSE ORGANS: ICD-10-CM

## 2021-07-06 LAB
ANION GAP SERPL CALC-SCNC: 11 MMOL/L — SIGNIFICANT CHANGE UP (ref 5–17)
BASOPHILS # BLD AUTO: 0.03 K/UL — SIGNIFICANT CHANGE UP (ref 0–0.2)
BASOPHILS NFR BLD AUTO: 0.3 % — SIGNIFICANT CHANGE UP (ref 0–2)
BLD GP AB SCN SERPL QL: NEGATIVE — SIGNIFICANT CHANGE UP
BUN SERPL-MCNC: 15 MG/DL — SIGNIFICANT CHANGE UP (ref 7–23)
CALCIUM SERPL-MCNC: 9 MG/DL — SIGNIFICANT CHANGE UP (ref 8.4–10.5)
CHLORIDE SERPL-SCNC: 101 MMOL/L — SIGNIFICANT CHANGE UP (ref 96–108)
CO2 SERPL-SCNC: 27 MMOL/L — SIGNIFICANT CHANGE UP (ref 22–31)
CREAT SERPL-MCNC: 0.86 MG/DL — SIGNIFICANT CHANGE UP (ref 0.5–1.3)
EOSINOPHIL # BLD AUTO: 0.01 K/UL — SIGNIFICANT CHANGE UP (ref 0–0.5)
EOSINOPHIL NFR BLD AUTO: 0.1 % — SIGNIFICANT CHANGE UP (ref 0–6)
GLUCOSE SERPL-MCNC: 86 MG/DL — SIGNIFICANT CHANGE UP (ref 70–99)
HCT VFR BLD CALC: 41.4 % — SIGNIFICANT CHANGE UP (ref 39–50)
HGB BLD-MCNC: 14.3 G/DL — SIGNIFICANT CHANGE UP (ref 13–17)
IMM GRANULOCYTES NFR BLD AUTO: 0.4 % — SIGNIFICANT CHANGE UP (ref 0–1.5)
LYMPHOCYTES # BLD AUTO: 2.33 K/UL — SIGNIFICANT CHANGE UP (ref 1–3.3)
LYMPHOCYTES # BLD AUTO: 24.6 % — SIGNIFICANT CHANGE UP (ref 13–44)
MAGNESIUM SERPL-MCNC: 1.6 MG/DL — SIGNIFICANT CHANGE UP (ref 1.6–2.6)
MCHC RBC-ENTMCNC: 28.9 PG — SIGNIFICANT CHANGE UP (ref 27–34)
MCHC RBC-ENTMCNC: 34.5 GM/DL — SIGNIFICANT CHANGE UP (ref 32–36)
MCV RBC AUTO: 83.8 FL — SIGNIFICANT CHANGE UP (ref 80–100)
MONOCYTES # BLD AUTO: 0.49 K/UL — SIGNIFICANT CHANGE UP (ref 0–0.9)
MONOCYTES NFR BLD AUTO: 5.2 % — SIGNIFICANT CHANGE UP (ref 2–14)
NEUTROPHILS # BLD AUTO: 6.56 K/UL — SIGNIFICANT CHANGE UP (ref 1.8–7.4)
NEUTROPHILS NFR BLD AUTO: 69.4 % — SIGNIFICANT CHANGE UP (ref 43–77)
NRBC # BLD: 0 /100 WBCS — SIGNIFICANT CHANGE UP (ref 0–0)
PLATELET # BLD AUTO: 285 K/UL — SIGNIFICANT CHANGE UP (ref 150–400)
POTASSIUM SERPL-MCNC: 3.9 MMOL/L — SIGNIFICANT CHANGE UP (ref 3.5–5.3)
POTASSIUM SERPL-SCNC: 3.9 MMOL/L — SIGNIFICANT CHANGE UP (ref 3.5–5.3)
RBC # BLD: 4.94 M/UL — SIGNIFICANT CHANGE UP (ref 4.2–5.8)
RBC # FLD: 11.9 % — SIGNIFICANT CHANGE UP (ref 10.3–14.5)
RH IG SCN BLD-IMP: POSITIVE — SIGNIFICANT CHANGE UP
SODIUM SERPL-SCNC: 139 MMOL/L — SIGNIFICANT CHANGE UP (ref 135–145)
T3FREE SERPL-MCNC: 2.12 PG/ML — SIGNIFICANT CHANGE UP (ref 1.8–4.6)
WBC # BLD: 9.46 K/UL — SIGNIFICANT CHANGE UP (ref 3.8–10.5)
WBC # FLD AUTO: 9.46 K/UL — SIGNIFICANT CHANGE UP (ref 3.8–10.5)

## 2021-07-06 PROCEDURE — 99233 SBSQ HOSP IP/OBS HIGH 50: CPT | Mod: GC

## 2021-07-06 PROCEDURE — 71045 X-RAY EXAM CHEST 1 VIEW: CPT | Mod: 26

## 2021-07-06 PROCEDURE — 36415 COLL VENOUS BLD VENIPUNCTURE: CPT | Mod: LT

## 2021-07-06 RX ORDER — DEXAMETHASONE 0.5 MG/5ML
2 ELIXIR ORAL EVERY 12 HOURS
Refills: 0 | Status: CANCELLED | OUTPATIENT
Start: 2021-07-10 | End: 2021-07-08

## 2021-07-06 RX ORDER — MULTIVIT WITH MIN/MFOLATE/K2 340-15/3 G
1 POWDER (GRAM) ORAL ONCE
Refills: 0 | Status: COMPLETED | OUTPATIENT
Start: 2021-07-06 | End: 2021-07-06

## 2021-07-06 RX ORDER — DEXAMETHASONE 0.5 MG/5ML
2 ELIXIR ORAL EVERY 8 HOURS
Refills: 0 | Status: DISCONTINUED | OUTPATIENT
Start: 2021-07-09 | End: 2021-07-08

## 2021-07-06 RX ORDER — DEXAMETHASONE 0.5 MG/5ML
1 ELIXIR ORAL EVERY 12 HOURS
Refills: 0 | Status: CANCELLED | OUTPATIENT
Start: 2021-07-11 | End: 2021-07-08

## 2021-07-06 RX ORDER — ACETAMINOPHEN 500 MG
1000 TABLET ORAL ONCE
Refills: 0 | Status: COMPLETED | OUTPATIENT
Start: 2021-07-06 | End: 2021-07-06

## 2021-07-06 RX ORDER — POVIDONE-IODINE 5 %
1 AEROSOL (ML) TOPICAL ONCE
Refills: 0 | Status: DISCONTINUED | OUTPATIENT
Start: 2021-07-06 | End: 2021-07-08

## 2021-07-06 RX ORDER — DEXAMETHASONE 0.5 MG/5ML
4 ELIXIR ORAL EVERY 6 HOURS
Refills: 0 | Status: COMPLETED | OUTPATIENT
Start: 2021-07-06 | End: 2021-07-07

## 2021-07-06 RX ORDER — GABAPENTIN 400 MG/1
300 CAPSULE ORAL ONCE
Refills: 0 | Status: COMPLETED | OUTPATIENT
Start: 2021-07-06 | End: 2021-07-06

## 2021-07-06 RX ORDER — CELECOXIB 200 MG/1
200 CAPSULE ORAL ONCE
Refills: 0 | Status: COMPLETED | OUTPATIENT
Start: 2021-07-06 | End: 2021-07-06

## 2021-07-06 RX ORDER — CEFAZOLIN SODIUM 1 G
2000 VIAL (EA) INJECTION EVERY 8 HOURS
Refills: 0 | Status: COMPLETED | OUTPATIENT
Start: 2021-07-07 | End: 2021-07-07

## 2021-07-06 RX ORDER — BENZOCAINE AND MENTHOL 5; 1 G/100ML; G/100ML
1 LIQUID ORAL EVERY 8 HOURS
Refills: 0 | Status: DISCONTINUED | OUTPATIENT
Start: 2021-07-06 | End: 2021-07-06

## 2021-07-06 RX ORDER — LACTULOSE 10 G/15ML
10 SOLUTION ORAL DAILY
Refills: 0 | Status: DISCONTINUED | OUTPATIENT
Start: 2021-07-06 | End: 2021-07-06

## 2021-07-06 RX ORDER — APREPITANT 80 MG/1
40 CAPSULE ORAL ONCE
Refills: 0 | Status: COMPLETED | OUTPATIENT
Start: 2021-07-06 | End: 2021-07-06

## 2021-07-06 RX ORDER — ONDANSETRON 8 MG/1
4 TABLET, FILM COATED ORAL EVERY 6 HOURS
Refills: 0 | Status: DISCONTINUED | OUTPATIENT
Start: 2021-07-06 | End: 2021-07-08

## 2021-07-06 RX ORDER — SODIUM CHLORIDE 9 MG/ML
1000 INJECTION INTRAMUSCULAR; INTRAVENOUS; SUBCUTANEOUS
Refills: 0 | Status: DISCONTINUED | OUTPATIENT
Start: 2021-07-06 | End: 2021-07-07

## 2021-07-06 RX ORDER — ACETAMINOPHEN 500 MG
1000 TABLET ORAL EVERY 8 HOURS
Refills: 0 | Status: DISCONTINUED | OUTPATIENT
Start: 2021-07-06 | End: 2021-07-08

## 2021-07-06 RX ORDER — OXYCODONE AND ACETAMINOPHEN 5; 325 MG/1; MG/1
1 TABLET ORAL EVERY 4 HOURS
Refills: 0 | Status: DISCONTINUED | OUTPATIENT
Start: 2021-07-06 | End: 2021-07-08

## 2021-07-06 RX ORDER — METHOCARBAMOL 500 MG/1
500 TABLET, FILM COATED ORAL EVERY 8 HOURS
Refills: 0 | Status: DISCONTINUED | OUTPATIENT
Start: 2021-07-06 | End: 2021-07-08

## 2021-07-06 RX ORDER — DEXAMETHASONE 0.5 MG/5ML
ELIXIR ORAL
Refills: 0 | Status: DISCONTINUED | OUTPATIENT
Start: 2021-07-06 | End: 2021-07-08

## 2021-07-06 RX ORDER — CHLORHEXIDINE GLUCONATE 213 G/1000ML
1 SOLUTION TOPICAL ONCE
Refills: 0 | Status: COMPLETED | OUTPATIENT
Start: 2021-07-06 | End: 2021-07-06

## 2021-07-06 RX ORDER — BENZOCAINE AND MENTHOL 5; 1 G/100ML; G/100ML
1 LIQUID ORAL EVERY 8 HOURS
Refills: 0 | Status: DISCONTINUED | OUTPATIENT
Start: 2021-07-06 | End: 2021-07-08

## 2021-07-06 RX ORDER — DEXAMETHASONE 0.5 MG/5ML
2 ELIXIR ORAL EVERY 6 HOURS
Refills: 0 | Status: DISCONTINUED | OUTPATIENT
Start: 2021-07-08 | End: 2021-07-08

## 2021-07-06 RX ADMIN — BENZOCAINE AND MENTHOL 1 LOZENGE: 5; 1 LIQUID ORAL at 23:41

## 2021-07-06 RX ADMIN — Medication 1000 MILLIGRAM(S): at 15:05

## 2021-07-06 RX ADMIN — CHLORHEXIDINE GLUCONATE 1 APPLICATION(S): 213 SOLUTION TOPICAL at 15:41

## 2021-07-06 RX ADMIN — Medication 1000 MILLIGRAM(S): at 19:02

## 2021-07-06 RX ADMIN — ONDANSETRON 4 MILLIGRAM(S): 8 TABLET, FILM COATED ORAL at 23:41

## 2021-07-06 RX ADMIN — BUPROPION HYDROCHLORIDE 150 MILLIGRAM(S): 150 TABLET, EXTENDED RELEASE ORAL at 13:32

## 2021-07-06 RX ADMIN — SODIUM CHLORIDE 75 MILLILITER(S): 9 INJECTION INTRAMUSCULAR; INTRAVENOUS; SUBCUTANEOUS at 20:46

## 2021-07-06 RX ADMIN — CELECOXIB 200 MILLIGRAM(S): 200 CAPSULE ORAL at 15:05

## 2021-07-06 RX ADMIN — Medication 50 MILLIGRAM(S): at 23:41

## 2021-07-06 RX ADMIN — GABAPENTIN 300 MILLIGRAM(S): 400 CAPSULE ORAL at 15:05

## 2021-07-06 RX ADMIN — APREPITANT 40 MILLIGRAM(S): 80 CAPSULE ORAL at 15:05

## 2021-07-06 RX ADMIN — METHOCARBAMOL 500 MILLIGRAM(S): 500 TABLET, FILM COATED ORAL at 23:41

## 2021-07-06 RX ADMIN — SODIUM CHLORIDE 75 MILLILITER(S): 9 INJECTION INTRAMUSCULAR; INTRAVENOUS; SUBCUTANEOUS at 07:33

## 2021-07-06 RX ADMIN — Medication 50 MICROGRAM(S): at 05:58

## 2021-07-06 RX ADMIN — CELECOXIB 200 MILLIGRAM(S): 200 CAPSULE ORAL at 19:02

## 2021-07-06 NOTE — PROGRESS NOTE ADULT - PROBLEM SELECTOR PROBLEM 5
Nutrition, metabolism, and development symptoms
Nutrition, metabolism, and development symptoms
Pre-op evaluation
no

## 2021-07-06 NOTE — PROGRESS NOTE ADULT - PROBLEM SELECTOR PLAN 1
pt w/ 2 days of acute onset RLE pain w/ + straight leg test likely 2/2 to sciatica. Motor and sensory remain intact. pt denies urinary or GI sx, no paresthesia to groin, no weakness to LE. no prior back or spinal surgeries. CT pelvis and lumbas spine without contrast shows L3-L4, L4-L5, L5-S1: There is disc bulge resulting in minimal spinal canal stenosis and mild bilateral neural foraminal narrowing.  -s/p Ketoralac 15mg ivp x2, 5mg percocet x1, robaxin 1500mg x1, valium 5mg iv, decadron 10mg iv  -c/w tramadol 75mg q6 prn severe, ibuprofen 150mg q6 prn moderate , tylenol 650mg q4 prn mild, patient in severe pain given 4mg morphine and valium, robaxin  -cont to monitor pain and for red flag signs  - MRI shows Moderate size right paracentral disc herniation at L5/S1 compressing the right S1 nerve root   - High dose steroids started  - Patient to OR today

## 2021-07-06 NOTE — PROGRESS NOTE ADULT - ASSESSMENT
39 y/o M w/
A 41yo M w/ pmhx of hypothyroidism (on 50mcg daily levothyroxine), depression (on buproprion 300mg qd), migraines (sumatriptan prn) presenting with 2 days of sharp constant 9/10 R gluteal pain likely 2/2 to sciatica
A 41yo M w/ pmhx of hypothyroidism (on 50mcg daily levothyroxine), depression (on buproprion 300mg qd), migraines (sumatriptan prn) presenting with 2 days of sharp constant 9/10 R gluteal pain likely 2/2 to sciatica

## 2021-07-06 NOTE — PROGRESS NOTE ADULT - PROBLEM SELECTOR PLAN 3
cont. levothyroxine
-pt without complaints of headache at this time. c/w sumatriptan prn
-pt without complaints of headache at this time. c/w sumatriptan prn

## 2021-07-06 NOTE — PROGRESS NOTE ADULT - PROBLEM SELECTOR PLAN 5
Fluids: none  Electrolytes: Mg>2, K>4  Nutrition:  No IVF currently needed, replete lytes PRN  Prophylaxis: none  Activity: AAT, OOBTC  GI: none  C: FC  Dispo: Admit to [facility]
low-risk Pt. for intermediate-risk surgery; EKG reviewed; Pt. is medically optimized; he can proceed to OR without need for further cardiac testing
Fluids: none  Electrolytes: Mg>2, K>4  Nutrition:  No IVF currently needed, replete lytes PRN  Prophylaxis: none  Activity: AAT, OOBTC  GI: none  C: FC  Dispo: Admit to [facility]

## 2021-07-06 NOTE — PROGRESS NOTE ADULT - PROBLEM SELECTOR PLAN 1
L5-S1 disc herniation w/ S1 nerve root compression; Neurosurgery following, plan for L5-S1 hemilaminotomy and microdiscectomy; cont. pain control + bowel regimen, PT post-op

## 2021-07-06 NOTE — PROGRESS NOTE ADULT - SUBJECTIVE AND OBJECTIVE BOX
Patient is a 40y old  Male who presents with a chief complaint of leg pain and numbbess (06 Jul 2021 11:12)      INTERVAL HPI/OVERNIGHT EVENTS:    Pt. seen and examined earlier today  Pt. c/o R sided LBP w/ RLE sciatica/radiculopathy  Sx stable/unchanged; pain controlled   No F/C, CP, SOB    Review of Systems: 12 point review of systems otherwise negative    MEDICATIONS  (STANDING):  buPROPion XL (24-Hour) . 150 milliGRAM(s) Oral daily  chlorhexidine 2% Cloths 1 Application(s) Topical once  levothyroxine 50 MICROGram(s) Oral daily  polyethylene glycol 3350 17 Gram(s) Oral daily  povidone iodine 5% Nasal Swab 1 Application(s) Both Nostrils once  senna 1 Tablet(s) Oral daily  sodium chloride 0.9%. 1000 milliLiter(s) (75 mL/Hr) IV Continuous <Continuous>    MEDICATIONS  (PRN):  acetaminophen   Tablet .. 650 milliGRAM(s) Oral every 4 hours PRN Mild Pain (1 - 3)  cyclobenzaprine 5 milliGRAM(s) Oral three times a day PRN Muscle Spasm  morphine  - Injectable 2 milliGRAM(s) IV Push every 6 hours PRN Severe Pain (7 - 10)  traMADol 75 milliGRAM(s) Oral every 6 hours PRN Moderate Pain (4 - 6)      Allergies    No Known Allergies    Intolerances          Vital Signs Last 24 Hrs  T(C): 37.1 (06 Jul 2021 09:02), Max: 37.1 (06 Jul 2021 09:02)  T(F): 98.8 (06 Jul 2021 09:02), Max: 98.8 (06 Jul 2021 09:02)  HR: 74 (06 Jul 2021 09:02) (68 - 85)  BP: 101/62 (06 Jul 2021 09:02) (101/62 - 123/77)  BP(mean): --  RR: 16 (06 Jul 2021 09:02) (16 - 17)  SpO2: 99% (06 Jul 2021 09:02) (98% - 99%)  CAPILLARY BLOOD GLUCOSE            Physical Exam:  (earlier today)  Daily     Daily   General:  comfortable-appearing in NAD  HEENT:  MMM  Abdomen:  soft NT ND  Extremities:  no edema B/L LE  Skin:  WWP  Neuro:  AAOx3, moving all extremities, see PGY-1 note    LABS:                        14.3   9.46  )-----------( 285      ( 06 Jul 2021 09:57 )             41.4     07-06    139  |  101  |  15  ----------------------------<  86  3.9   |  27  |  0.86    Ca    9.0      06 Jul 2021 09:57  Phos  2.9     07-05  Mg     1.6     07-06    TPro  7.6  /  Alb  4.4  /  TBili  0.7  /  DBili  x   /  AST  14  /  ALT  11  /  AlkPhos  54  07-05    PT/INR - ( 05 Jul 2021 18:47 )   PT: 12.5 sec;   INR: 1.04          PTT - ( 05 Jul 2021 18:47 )  PTT:33.3 sec

## 2021-07-06 NOTE — PACU DISCHARGE NOTE - COMMENTS
Patient is A&OX4. Minimal pain at present. Decline med offer. Tolerated PO. VSSS. LR at 75cc/hr. Lower back dressing with Telfa and Tegaderm dry and intact. GRAHAM. Due to void by 2 am. No neuro deficit noted. Report given to floor RN. Patient to be taken down to room by PCAs.

## 2021-07-06 NOTE — PROGRESS NOTE ADULT - PROBLEM SELECTOR PLAN 4
-c/w buproprion 150mg qd (increase back to home dose (300mg QD)    #Pre-op clearance  Patient found to have S1 nerve root compression on MRI. [facility] consulted. s/p 10mg IV decadron. likely will need surgical intervention. Pt w/ METS >4, RCRI Class I. Patient deemed low risk for intermediate risk procedure.  EKG with...

## 2021-07-06 NOTE — PROGRESS NOTE ADULT - SUBJECTIVE AND OBJECTIVE BOX
Surgery: L5-S1 hemilaminotomy with microdiscectomy   Consent: Signed by patient vs HCP: yes                    NAME/NUMBER of HCP:     Representative Consent: [  ] Signed by patient vs HCP                                                 [  ] N/A -> only for cerebral angiogram    No Known Allergies      OVERNIGHT EVENTS: ANAIS overnight, neuro stable.     T(C): 37.1 (07-06-21 @ 09:02), Max: 37.1 (07-06-21 @ 09:02)  HR: 74 (07-06-21 @ 09:02) (68 - 85)  BP: 101/62 (07-06-21 @ 09:02) (101/62 - 123/77)  RR: 16 (07-06-21 @ 09:02) (16 - 17)  SpO2: 99% (07-06-21 @ 09:02) (98% - 99%)  Wt(kg): --    EXAM:  General: patient seen laying supine in bed in NAD  Neuro: AAOx3, FC, OE spontaneously, speech clear and fluent, LUE/RUE 5/5, RLE HF/KF/KE 4+/5 EHL/DF/PF 5/5 pain limited, LLE HF 4+/5  KF/KE/DF/PF/EHL 5/5 pain limited, decreased sensation to light touch to RLE S1 distribution in thigh  HEENT: PERRL, EOMI  Neck: supple  Cardiac: RRR, S1S2  Pulmonary: chest rise symmetric  Abdomen: soft, nontender, nondistended  Ext: warm, perfusing well        07-06    139  |  101  |  15  ----------------------------<  86  3.9   |  27  |  0.86    Ca    9.0      06 Jul 2021 09:57  Phos  2.9     07-05  Mg     1.6     07-06    TPro  7.6  /  Alb  4.4  /  TBili  0.7  /  DBili  x   /  AST  14  /  ALT  11  /  AlkPhos  54  07-05    CBC Full  -  ( 06 Jul 2021 09:57 )  WBC Count : 9.46 K/uL  RBC Count : 4.94 M/uL  Hemoglobin : 14.3 g/dL  Hematocrit : 41.4 %  Platelet Count - Automated : 285 K/uL  Mean Cell Volume : 83.8 fl  Mean Cell Hemoglobin : 28.9 pg  Mean Cell Hemoglobin Concentration : 34.5 gm/dL  Auto Neutrophil # : 6.56 K/uL  Auto Lymphocyte # : 2.33 K/uL  Auto Monocyte # : 0.49 K/uL  Auto Eosinophil # : 0.01 K/uL  Auto Basophil # : 0.03 K/uL  Auto Neutrophil % : 69.4 %  Auto Lymphocyte % : 24.6 %  Auto Monocyte % : 5.2 %  Auto Eosinophil % : 0.1 %  Auto Basophil % : 0.3 %    PT/INR - ( 05 Jul 2021 18:47 )   PT: 12.5 sec;   INR: 1.04          PTT - ( 05 Jul 2021 18:47 )  PTT:33.3 sec    Pregnancy test (serum hcg for any female under 56, must be resulted day before OR): [ ] Negative Result  [ ] Positive Result  [X ] N/A : male or female>55 y/o  Type & Screen (in past 72hrs):     2 Type & Screen within 72 hours if anticipate blood need in OR:  X Y _ N     Blood ordered and on hold for OR:   [ ] No need     [ ] 1u pRBC on hold      [ ] 2u pRBC on hold    COVID swab (in past 48hrs): X Y  _N    CXR: pending   EKG: normal   ECHO: N/a   Medical Clearances: n/a  Other Clearances: n/a     Last dose of antiplatelet/anticoagulation drug: n/a    Implanted Devices (pacemaker, drug pump...etc):  []YES   [X] NO                  If yes --> EPS consulted to interrogate device: [ ] YES  [ ] NO                            If yes -->  EPS called to let them know patient going for surgery: [ ] device needs to be turned off                                                                                                                                                 [ ] magnet needs to be placed for surgery                                                                                                                                                [ ] nothing to do per EP, may proceed with cautery use in OR                                       3M nasal swab ordered?  X Y  _N    Cranial surgery: Order written for hair to be shampooed night before surgery and morning before surgery  [] yes   [X]no  Chlorhexidine Wipes ordered for Neck Down?  X Y  _ N  (twice a day if 1 day before surgery, daily for 3 days if 3 days prior, daily if in ICU)                 Assessment:  39 y/o male presenting with severe RLE radicular pain found to have S1 nerve root compression by L5-S1 disc herniation, now planned for OR today for L5-S1 hemilaminotomy and microdiscectomy.       Plan:  - NPO w/ IVF @75ml/hr  - 3M swab and chlorhexidine   - ERAS protocol  - Type and screen/CXR pending   - Ac normal  - Consent signed and in chart   - Added onto OR for ~4PM     Assessment and plan discussed with Dr. Schulz     Assessment:  Present when checked    []  GCS  E   V  M     Heart Failure: []Acute, [] acute on chronic , []chronic  Heart Failure:  [] Diastolic (HFpEF), [] Systolic (HFrEF), []Combined (HFpEF and HFrEF), [] RHF, [] Pulm HTN, [] Other    [] ZARINA, [] ATN, [] AIN, [] other  [] CKD1, [] CKD2, [] CKD 3, [] CKD 4, [] CKD 5, []ESRD    Encephalopathy: [] Metabolic, [] Hepatic, [] toxic, [] Neurological, [] Other    Abnormal Nurtitional Status: [] malnurtition (see nutrition note), [ ]underweight: BMI < 19, [] morbid obesity: BMI >40, [] Cachexia    [] Sepsis  [] hypovolemic shock,[] cardiogenic shock, [] hemorrhagic shock, [] neuogenic shock  [] Acute Respiratory Failure  []Cerebral edema, [] Brain compression/ herniation,   [] Functional quadriplegia  [] Acute blood loss anemia

## 2021-07-06 NOTE — PROGRESS NOTE ADULT - SUBJECTIVE AND OBJECTIVE BOX
INTERVAL HPI/OVERNIGHT EVENTS:      VITAL SIGNS:  T(F): 98.8 (07-06-21 @ 09:02)  HR: 74 (07-06-21 @ 09:02)  BP: 101/62 (07-06-21 @ 09:02)  RR: 16 (07-06-21 @ 09:02)  SpO2: 99% (07-06-21 @ 09:02)  Wt(kg): --    PHYSICAL EXAM:      MEDICATIONS  (STANDING):  buPROPion XL (24-Hour) . 150 milliGRAM(s) Oral daily  levothyroxine 50 MICROGram(s) Oral daily  polyethylene glycol 3350 17 Gram(s) Oral daily  senna 1 Tablet(s) Oral daily  sodium chloride 0.9%. 1000 milliLiter(s) (75 mL/Hr) IV Continuous <Continuous>    MEDICATIONS  (PRN):  acetaminophen   Tablet .. 650 milliGRAM(s) Oral every 4 hours PRN Mild Pain (1 - 3)  cyclobenzaprine 5 milliGRAM(s) Oral three times a day PRN Muscle Spasm  morphine  - Injectable 2 milliGRAM(s) IV Push every 6 hours PRN Severe Pain (7 - 10)  traMADol 75 milliGRAM(s) Oral every 6 hours PRN Moderate Pain (4 - 6)      Allergies    No Known Allergies    Intolerances        LABS:                        14.3   9.46  )-----------( 285      ( 06 Jul 2021 09:57 )             41.4     07-06    139  |  101  |  15  ----------------------------<  86  3.9   |  27  |  0.86    Ca    9.0      06 Jul 2021 09:57  Phos  2.9     07-05  Mg     1.6     07-06    TPro  7.6  /  Alb  4.4  /  TBili  0.7  /  DBili  x   /  AST  14  /  ALT  11  /  AlkPhos  54  07-05    PT/INR - ( 05 Jul 2021 18:47 )   PT: 12.5 sec;   INR: 1.04          PTT - ( 05 Jul 2021 18:47 )  PTT:33.3 sec        RADIOLOGY & ADDITIONAL TESTS:  Reviewed INTERVAL HPI/OVERNIGHT EVENTS:    A 39yo M w/ pmhx of hypothyroidism (on 50mcg daily levothyroxine), depression (on buproprion 300mg qd), migraines (sumatriptan prn) presented to ED on 7/4 with 2 days of acute-onset sharp constant 10/10 R gluteal pain radiating to left posterior and lateral thigh and numbness of left lateral thigh and leg.  Pain relieved with sitting and bedrest, worsened with mobility to where patient feels unable to walk more than a few feet.  CT pelvis and lumbar spine without contrast showed L3-L4, L4-L5, L5-S1 disc bulges resulting in minimal spinal canal stenosis and mild bilateral neural foraminal narrowing.  MRI was concerning for right S1 nerve root compression.  On exam, right LE SL sign positive to 30 degrees, sensation reduced right lateral upper thigh and leg compared to left.  Given severe requiring management with IV pain medications and MRI concerning for S1 root compression correlating with symptoms, neurosurgery was consulted for evaluation.      Overnight atient         VITAL SIGNS:  T(F): 98.8 (07-06-21 @ 09:02)  HR: 74 (07-06-21 @ 09:02)  BP: 101/62 (07-06-21 @ 09:02)  RR: 16 (07-06-21 @ 09:02)  SpO2: 99% (07-06-21 @ 09:02)  Wt(kg): --    PHYSICAL EXAM:  General: sitting in bed, appears to be in mild distress  HEENT: sclera non-icteric, no masses, no JVD, MMM  Respiratory: CTA b/l, no wheezing, no rhonchi, no rales, without accessory muscle use Cardiovascular: RRR, normal S1S2, no M/R/G  Gastrointestinal: soft, NTND, no masses palpable, BS normal  Extremities: Warm, well perfused, pulses equal bilateral upper and lower extremities, no edema, no clubbing. Right LE SL sign positive to 30 degrees, sensation reduced right lateral upper thigh and leg compared to left  Neurological: AAOx3  Skin: Normal temperature, warm, dry      MEDICATIONS  (STANDING):  buPROPion XL (24-Hour) . 150 milliGRAM(s) Oral daily  levothyroxine 50 MICROGram(s) Oral daily  polyethylene glycol 3350 17 Gram(s) Oral daily  senna 1 Tablet(s) Oral daily  sodium chloride 0.9%. 1000 milliLiter(s) (75 mL/Hr) IV Continuous <Continuous>    MEDICATIONS  (PRN):  acetaminophen   Tablet .. 650 milliGRAM(s) Oral every 4 hours PRN Mild Pain (1 - 3)  cyclobenzaprine 5 milliGRAM(s) Oral three times a day PRN Muscle Spasm  morphine  - Injectable 2 milliGRAM(s) IV Push every 6 hours PRN Severe Pain (7 - 10)  traMADol 75 milliGRAM(s) Oral every 6 hours PRN Moderate Pain (4 - 6)      Allergies    No Known Allergies    Intolerances        LABS:                        14.3   9.46  )-----------( 285      ( 06 Jul 2021 09:57 )             41.4     07-06    139  |  101  |  15  ----------------------------<  86  3.9   |  27  |  0.86    Ca    9.0      06 Jul 2021 09:57  Phos  2.9     07-05  Mg     1.6     07-06    TPro  7.6  /  Alb  4.4  /  TBili  0.7  /  DBili  x   /  AST  14  /  ALT  11  /  AlkPhos  54  07-05    PT/INR - ( 05 Jul 2021 18:47 )   PT: 12.5 sec;   INR: 1.04          PTT - ( 05 Jul 2021 18:47 )  PTT:33.3 sec        RADIOLOGY & ADDITIONAL TESTS:  Reviewed INTERVAL HPI/OVERNIGHT EVENTS:    Hospital Course:  Patient is 41yo M w/ pmhx of hypothyroidism (on 50mcg daily levothyroxine), depression (on buproprion 300mg qd), migraines (sumatriptan prn) presented to ED on 7/4 with 2 days of acute-onset sharp constant 10/10 R gluteal pain radiating to left posterior and lateral thigh and numbness of left lateral thigh and leg.  Pain relieved with sitting and bedrest, worsened with mobility with patient unable to even a few feet without pain.  CT pelvis and lumbar spine without contrast showed L3-L4, L4-L5, L5-S1 disc bulges resulting in minimal spinal canal stenosis and mild bilateral neural foraminal narrowing.  MRI was concerning for right S1 nerve root compression.  On exam, right LE SL sign positive to 30 degrees, sensation reduced right lateral upper thigh and leg compared to left.  Given severe constant pain requiring management with IV pain medications and MRI concerning for S1 root compression correlating with symptoms, neurosurgery was consulted for evaluation.      This AM patient reporting severe 10/10 R gluteal pain that radiates to upper thigh with numbness of upper lateral thigh. He reports numbness worsening and now extends down left lateal leg.  Pain worse walking.  He reports being in constant pain with relief only with IV pain medications.  He also reports constipation.  Patient denies n/v, cp/sb, abdominal discomfort, dyuria,.      VITAL SIGNS:  T(F): 98.8 (07-06-21 @ 09:02)  HR: 74 (07-06-21 @ 09:02)  BP: 101/62 (07-06-21 @ 09:02)  RR: 16 (07-06-21 @ 09:02)  SpO2: 99% (07-06-21 @ 09:02)  Wt(kg): --    PHYSICAL EXAM:  General: sitting in bed, appears to be in mild distress  HEENT: sclera non-icteric, no masses, no JVD, MMM  Respiratory: CTA b/l, no wheezing, no rhonchi, no rales, without accessory muscle use Cardiovascular: RRR, normal S1S2, no M/R/G  Gastrointestinal: soft, NTND, no masses palpable, BS normal  Extremities: Warm, well perfused, pulses equal bilateral upper and lower extremities, no edema, no clubbing. Right LE SL sign positive to 30 degrees, sensation reduced right lateral upper thigh and leg compared to left  Neurological: AAOx3  Skin: Normal temperature, warm, dry      MEDICATIONS  (STANDING):  buPROPion XL (24-Hour) . 150 milliGRAM(s) Oral daily  levothyroxine 50 MICROGram(s) Oral daily  polyethylene glycol 3350 17 Gram(s) Oral daily  senna 1 Tablet(s) Oral daily  sodium chloride 0.9%. 1000 milliLiter(s) (75 mL/Hr) IV Continuous <Continuous>    MEDICATIONS  (PRN):  acetaminophen   Tablet .. 650 milliGRAM(s) Oral every 4 hours PRN Mild Pain (1 - 3)  cyclobenzaprine 5 milliGRAM(s) Oral three times a day PRN Muscle Spasm  morphine  - Injectable 2 milliGRAM(s) IV Push every 6 hours PRN Severe Pain (7 - 10)  traMADol 75 milliGRAM(s) Oral every 6 hours PRN Moderate Pain (4 - 6)      Allergies    No Known Allergies    Intolerances        LABS:                        14.3   9.46  )-----------( 285      ( 06 Jul 2021 09:57 )             41.4     07-06    139  |  101  |  15  ----------------------------<  86  3.9   |  27  |  0.86    Ca    9.0      06 Jul 2021 09:57  Phos  2.9     07-05  Mg     1.6     07-06    TPro  7.6  /  Alb  4.4  /  TBili  0.7  /  DBili  x   /  AST  14  /  ALT  11  /  AlkPhos  54  07-05    PT/INR - ( 05 Jul 2021 18:47 )   PT: 12.5 sec;   INR: 1.04          PTT - ( 05 Jul 2021 18:47 )  PTT:33.3 sec        RADIOLOGY & ADDITIONAL TESTS:  Reviewed INTERVAL HPI/OVERNIGHT EVENTS:    Hospital Course:  Patient is 41yo M w/ pmhx of hypothyroidism (on 50mcg daily levothyroxine), depression (on buproprion 300mg qd), migraines (sumatriptan prn) presented to [facility] ED on 7/4 with 2 days of acute-onset sharp constant 10/10 R gluteal pain radiating to left posterior and lateral thigh and numbness of left lateral thigh and leg.  Pain relieved with sitting and bedrest, worsened with mobility with patient unable to even a few feet without pain.  Patient received 10m Decadron in the ED and at [facility].  CT pelvis and lumbar spine without contrast showed L3-L4, L4-L5, L5-S1 disc bulges resulting in minimal spinal canal stenosis and mild bilateral neural foraminal narrowing.  MRI was concerning for right S1 nerve root compression.  On exam, right LE SL sign positive to 30 degrees, sensation reduced right lateral upper thigh and leg compared to left.  Given severe constant pain requiring management with IV pain medications and MRI concerning for S1 root compression correlating with symptoms, neurosurgery was consulted for evaluation.      This AM patient reporting severe 10/10 R gluteal pain that radiates to upper thigh with numbness of upper lateral thigh. He reports numbness worsening and now extends down left lateal leg.  Pain worse walking.  He reports being in constant pain with relief only with IV pain medications.  He also reports constipation.  Patient denies n/v, cp/sb, abdominal discomfort, dyuria,.      VITAL SIGNS:  T(F): 98.8 (07-06-21 @ 09:02)  HR: 74 (07-06-21 @ 09:02)  BP: 101/62 (07-06-21 @ 09:02)  RR: 16 (07-06-21 @ 09:02)  SpO2: 99% (07-06-21 @ 09:02)  Wt(kg): --    PHYSICAL EXAM:  General: sitting in bed, appears to be in mild distress  HEENT: sclera non-icteric, no masses, no JVD, MMM  Respiratory: CTA b/l, no wheezing, no rhonchi, no rales, without accessory muscle use Cardiovascular: RRR, normal S1S2, no M/R/G  Gastrointestinal: soft, NTND, no masses palpable, BS normal  Extremities: Warm, well perfused, pulses equal bilateral upper and lower extremities, no edema, no clubbing. Right LE SL sign positive to 30 degrees, sensation reduced right lateral upper thigh and leg compared to left  Neurological: AAOx3  Skin: Normal temperature, warm, dry      MEDICATIONS  (STANDING):  buPROPion XL (24-Hour) . 150 milliGRAM(s) Oral daily  levothyroxine 50 MICROGram(s) Oral daily  polyethylene glycol 3350 17 Gram(s) Oral daily  senna 1 Tablet(s) Oral daily  sodium chloride 0.9%. 1000 milliLiter(s) (75 mL/Hr) IV Continuous <Continuous>    MEDICATIONS  (PRN):  acetaminophen   Tablet .. 650 milliGRAM(s) Oral every 4 hours PRN Mild Pain (1 - 3)  cyclobenzaprine 5 milliGRAM(s) Oral three times a day PRN Muscle Spasm  morphine  - Injectable 2 milliGRAM(s) IV Push every 6 hours PRN Severe Pain (7 - 10)  traMADol 75 milliGRAM(s) Oral every 6 hours PRN Moderate Pain (4 - 6)      Allergies    No Known Allergies    Intolerances        LABS:                        14.3   9.46  )-----------( 285      ( 06 Jul 2021 09:57 )             41.4     07-06    139  |  101  |  15  ----------------------------<  86  3.9   |  27  |  0.86    Ca    9.0      06 Jul 2021 09:57  Phos  2.9     07-05  Mg     1.6     07-06    TPro  7.6  /  Alb  4.4  /  TBili  0.7  /  DBili  x   /  AST  14  /  ALT  11  /  AlkPhos  54  07-05    PT/INR - ( 05 Jul 2021 18:47 )   PT: 12.5 sec;   INR: 1.04          PTT - ( 05 Jul 2021 18:47 )  PTT:33.3 sec        RADIOLOGY & ADDITIONAL TESTS:  Reviewed

## 2021-07-06 NOTE — PROGRESS NOTE ADULT - SUBJECTIVE AND OBJECTIVE BOX
Acute onset RLE posterior pain last Saturday, when to , sent colt with meds, pain became unbearable, went to [facility] ER Saturday night/early Sunday morning for intolerable pain, inability to walk. Could not walk yesterday, pain feels somewhat better with multimodal IV medications and bed rest. Continued severe pain with movement. 4/5 right gastroc/FHL and numbness/tingling lateral aspect right foot. We discussed MRI showing large right L5-S1 HNP with severe S1 root compression correlating with his symptoms. We discussed options for non-op vs operative case. Given his severe pain requiring IV medications and essential immobility, and his motor/sensory deficits he is a candidate for surgery. I recommend right L5-S1 hemilaminotomy/microdiscectomy. We discussed the r/b/a. We discussed that early decompression may allow increased chance of nerve recovery but that the rate/extent of recovery cannot be predicted. He wishes to proceed with surgery today.

## 2021-07-07 DIAGNOSIS — M51.16 INTERVERTEBRAL DISC DISORDERS WITH RADICULOPATHY, LUMBAR REGION: ICD-10-CM

## 2021-07-07 LAB
A1C WITH ESTIMATED AVERAGE GLUCOSE RESULT: 4.9 % — SIGNIFICANT CHANGE UP (ref 4–5.6)
ANION GAP SERPL CALC-SCNC: 10 MMOL/L — SIGNIFICANT CHANGE UP (ref 5–17)
BASOPHILS # BLD AUTO: 0.02 K/UL — SIGNIFICANT CHANGE UP (ref 0–0.2)
BASOPHILS NFR BLD AUTO: 0.1 % — SIGNIFICANT CHANGE UP (ref 0–2)
BUN SERPL-MCNC: 14 MG/DL — SIGNIFICANT CHANGE UP (ref 7–23)
CALCIUM SERPL-MCNC: 8.9 MG/DL — SIGNIFICANT CHANGE UP (ref 8.4–10.5)
CHLORIDE SERPL-SCNC: 102 MMOL/L — SIGNIFICANT CHANGE UP (ref 96–108)
CO2 SERPL-SCNC: 27 MMOL/L — SIGNIFICANT CHANGE UP (ref 22–31)
COVID-19 SPIKE DOMAIN AB INTERP: POSITIVE
COVID-19 SPIKE DOMAIN ANTIBODY RESULT: >250 U/ML — HIGH
CREAT SERPL-MCNC: 0.86 MG/DL — SIGNIFICANT CHANGE UP (ref 0.5–1.3)
EOSINOPHIL # BLD AUTO: 0 K/UL — SIGNIFICANT CHANGE UP (ref 0–0.5)
EOSINOPHIL NFR BLD AUTO: 0 % — SIGNIFICANT CHANGE UP (ref 0–6)
ESTIMATED AVERAGE GLUCOSE: 94 MG/DL — SIGNIFICANT CHANGE UP (ref 68–114)
GLUCOSE BLDC GLUCOMTR-MCNC: 101 MG/DL — HIGH (ref 70–99)
GLUCOSE BLDC GLUCOMTR-MCNC: 127 MG/DL — HIGH (ref 70–99)
GLUCOSE BLDC GLUCOMTR-MCNC: 138 MG/DL — HIGH (ref 70–99)
GLUCOSE SERPL-MCNC: 101 MG/DL — HIGH (ref 70–99)
HCT VFR BLD CALC: 41.6 % — SIGNIFICANT CHANGE UP (ref 39–50)
HGB BLD-MCNC: 14.1 G/DL — SIGNIFICANT CHANGE UP (ref 13–17)
IMM GRANULOCYTES NFR BLD AUTO: 0.5 % — SIGNIFICANT CHANGE UP (ref 0–1.5)
LYMPHOCYTES # BLD AUTO: 0.98 K/UL — LOW (ref 1–3.3)
LYMPHOCYTES # BLD AUTO: 6.9 % — LOW (ref 13–44)
MAGNESIUM SERPL-MCNC: 1.8 MG/DL — SIGNIFICANT CHANGE UP (ref 1.6–2.6)
MCHC RBC-ENTMCNC: 28.1 PG — SIGNIFICANT CHANGE UP (ref 27–34)
MCHC RBC-ENTMCNC: 33.9 GM/DL — SIGNIFICANT CHANGE UP (ref 32–36)
MCV RBC AUTO: 82.9 FL — SIGNIFICANT CHANGE UP (ref 80–100)
MONOCYTES # BLD AUTO: 0.33 K/UL — SIGNIFICANT CHANGE UP (ref 0–0.9)
MONOCYTES NFR BLD AUTO: 2.3 % — SIGNIFICANT CHANGE UP (ref 2–14)
NEUTROPHILS # BLD AUTO: 12.72 K/UL — HIGH (ref 1.8–7.4)
NEUTROPHILS NFR BLD AUTO: 90.2 % — HIGH (ref 43–77)
NRBC # BLD: 0 /100 WBCS — SIGNIFICANT CHANGE UP (ref 0–0)
PHOSPHATE SERPL-MCNC: 3.7 MG/DL — SIGNIFICANT CHANGE UP (ref 2.5–4.5)
PLATELET # BLD AUTO: 283 K/UL — SIGNIFICANT CHANGE UP (ref 150–400)
POTASSIUM SERPL-MCNC: 4.6 MMOL/L — SIGNIFICANT CHANGE UP (ref 3.5–5.3)
POTASSIUM SERPL-SCNC: 4.6 MMOL/L — SIGNIFICANT CHANGE UP (ref 3.5–5.3)
RBC # BLD: 5.02 M/UL — SIGNIFICANT CHANGE UP (ref 4.2–5.8)
RBC # FLD: 12.2 % — SIGNIFICANT CHANGE UP (ref 10.3–14.5)
SARS-COV-2 IGG+IGM SERPL QL IA: >250 U/ML — HIGH
SARS-COV-2 IGG+IGM SERPL QL IA: POSITIVE
SODIUM SERPL-SCNC: 139 MMOL/L — SIGNIFICANT CHANGE UP (ref 135–145)
WBC # BLD: 14.12 K/UL — HIGH (ref 3.8–10.5)
WBC # FLD AUTO: 14.12 K/UL — HIGH (ref 3.8–10.5)

## 2021-07-07 PROCEDURE — 99024 POSTOP FOLLOW-UP VISIT: CPT

## 2021-07-07 RX ORDER — GLUCAGON INJECTION, SOLUTION 0.5 MG/.1ML
1 INJECTION, SOLUTION SUBCUTANEOUS ONCE
Refills: 0 | Status: DISCONTINUED | OUTPATIENT
Start: 2021-07-07 | End: 2021-07-08

## 2021-07-07 RX ORDER — SODIUM CHLORIDE 9 MG/ML
1000 INJECTION, SOLUTION INTRAVENOUS
Refills: 0 | Status: DISCONTINUED | OUTPATIENT
Start: 2021-07-07 | End: 2021-07-08

## 2021-07-07 RX ORDER — INSULIN LISPRO 100/ML
VIAL (ML) SUBCUTANEOUS
Refills: 0 | Status: DISCONTINUED | OUTPATIENT
Start: 2021-07-07 | End: 2021-07-08

## 2021-07-07 RX ORDER — PANTOPRAZOLE SODIUM 20 MG/1
40 TABLET, DELAYED RELEASE ORAL
Refills: 0 | Status: DISCONTINUED | OUTPATIENT
Start: 2021-07-07 | End: 2021-07-08

## 2021-07-07 RX ORDER — DEXTROSE 50 % IN WATER 50 %
25 SYRINGE (ML) INTRAVENOUS ONCE
Refills: 0 | Status: DISCONTINUED | OUTPATIENT
Start: 2021-07-07 | End: 2021-07-08

## 2021-07-07 RX ORDER — LACTULOSE 10 G/15ML
20 SOLUTION ORAL ONCE
Refills: 0 | Status: COMPLETED | OUTPATIENT
Start: 2021-07-07 | End: 2021-07-07

## 2021-07-07 RX ORDER — DEXTROSE 50 % IN WATER 50 %
15 SYRINGE (ML) INTRAVENOUS ONCE
Refills: 0 | Status: DISCONTINUED | OUTPATIENT
Start: 2021-07-07 | End: 2021-07-08

## 2021-07-07 RX ORDER — DEXTROSE 50 % IN WATER 50 %
12.5 SYRINGE (ML) INTRAVENOUS ONCE
Refills: 0 | Status: DISCONTINUED | OUTPATIENT
Start: 2021-07-07 | End: 2021-07-08

## 2021-07-07 RX ORDER — SENNA PLUS 8.6 MG/1
2 TABLET ORAL AT BEDTIME
Refills: 0 | Status: DISCONTINUED | OUTPATIENT
Start: 2021-07-07 | End: 2021-07-08

## 2021-07-07 RX ADMIN — Medication 1000 MILLIGRAM(S): at 14:38

## 2021-07-07 RX ADMIN — Medication 4 MILLIGRAM(S): at 11:15

## 2021-07-07 RX ADMIN — OXYCODONE AND ACETAMINOPHEN 1 TABLET(S): 5; 325 TABLET ORAL at 09:03

## 2021-07-07 RX ADMIN — Medication 4 MILLIGRAM(S): at 02:11

## 2021-07-07 RX ADMIN — METHOCARBAMOL 500 MILLIGRAM(S): 500 TABLET, FILM COATED ORAL at 22:31

## 2021-07-07 RX ADMIN — METHOCARBAMOL 500 MILLIGRAM(S): 500 TABLET, FILM COATED ORAL at 05:03

## 2021-07-07 RX ADMIN — POLYETHYLENE GLYCOL 3350 17 GRAM(S): 17 POWDER, FOR SOLUTION ORAL at 11:15

## 2021-07-07 RX ADMIN — Medication 1000 MILLIGRAM(S): at 23:30

## 2021-07-07 RX ADMIN — SENNA PLUS 2 TABLET(S): 8.6 TABLET ORAL at 22:30

## 2021-07-07 RX ADMIN — Medication 50 MICROGRAM(S): at 05:03

## 2021-07-07 RX ADMIN — Medication 50 MILLIGRAM(S): at 13:38

## 2021-07-07 RX ADMIN — Medication 4 MILLIGRAM(S): at 07:20

## 2021-07-07 RX ADMIN — Medication 1000 MILLIGRAM(S): at 06:00

## 2021-07-07 RX ADMIN — Medication 2 MILLIGRAM(S): at 23:04

## 2021-07-07 RX ADMIN — PANTOPRAZOLE SODIUM 40 MILLIGRAM(S): 20 TABLET, DELAYED RELEASE ORAL at 05:06

## 2021-07-07 RX ADMIN — Medication 1000 MILLIGRAM(S): at 22:30

## 2021-07-07 RX ADMIN — Medication 1000 MILLIGRAM(S): at 13:38

## 2021-07-07 RX ADMIN — METHOCARBAMOL 500 MILLIGRAM(S): 500 TABLET, FILM COATED ORAL at 13:38

## 2021-07-07 RX ADMIN — Medication 2000 MILLIGRAM(S): at 11:14

## 2021-07-07 RX ADMIN — Medication 50 MILLIGRAM(S): at 05:04

## 2021-07-07 RX ADMIN — ONDANSETRON 4 MILLIGRAM(S): 8 TABLET, FILM COATED ORAL at 23:04

## 2021-07-07 RX ADMIN — BUPROPION HYDROCHLORIDE 150 MILLIGRAM(S): 150 TABLET, EXTENDED RELEASE ORAL at 11:14

## 2021-07-07 RX ADMIN — Medication 4 MILLIGRAM(S): at 17:11

## 2021-07-07 RX ADMIN — Medication 50 MILLIGRAM(S): at 22:30

## 2021-07-07 RX ADMIN — LACTULOSE 20 GRAM(S): 10 SOLUTION ORAL at 17:10

## 2021-07-07 RX ADMIN — Medication 2000 MILLIGRAM(S): at 02:11

## 2021-07-07 RX ADMIN — OXYCODONE AND ACETAMINOPHEN 1 TABLET(S): 5; 325 TABLET ORAL at 10:03

## 2021-07-07 RX ADMIN — Medication 1000 MILLIGRAM(S): at 05:03

## 2021-07-07 NOTE — DISCHARGE NOTE PROVIDER - NSDCFUADDAPPT_GEN_ALL_CORE_FT
Please call Dr. Schulz's office at [phone] to schedule a follow up appointment.  Please call Dr. Schulz's office at [phone] to schedule a follow up appointment.     Please also schedule a follow up appointment with your primary care provider

## 2021-07-07 NOTE — PROGRESS NOTE ADULT - SUBJECTIVE AND OBJECTIVE BOX
ambulated with PT  pre op right leg pain feels resolved, residual lateral foot numbness  strong symmetric TA EHL trace residual right gastroc FHL weakness, improved cf pre op    mobilize  PT  pain meds as needed  discussed anticipated recovery process, activity restrictions, f/u, all questions answered  discussed his large annular defect identified at surgery and need to avoid high impact activities in the future to potentially decrease the risk of recurrent problems at this level

## 2021-07-07 NOTE — PROGRESS NOTE ADULT - SUBJECTIVE AND OBJECTIVE BOX
NEUROSURGERY POST OP NOTE:    POD# 0 S/P Right L5-S1 hemilaminectomy, discectomy. Large herniated fragment and large tear in the annulus        S: Surgery completed without complications. Patient tolerated well. Postop patient reports that pain is well controlled. Patient denies current numbness, tingling, or weakness in extremities.       T(C): 36.6 (07-07-21 @ 00:37), Max: 37.1 (07-06-21 @ 09:02)  HR: 83 (07-07-21 @ 00:37) (66 - 92)  BP: 115/67 (07-07-21 @ 00:37) (101/62 - 132/84)  RR: 16 (07-07-21 @ 00:37) (10 - 20)  SpO2: 98% (07-07-21 @ 00:37) (98% - 100%)      07-06-21 @ 07:01  -  07-07-21 @ 01:08  --------------------------------------------------------  IN: 370 mL / OUT: 0 mL / NET: 370 mL        acetaminophen   Tablet .. 1000 milliGRAM(s) Oral every 8 hours  benzocaine 15 mG/menthol 3.6 mG Lozenge 1 Lozenge Oral every 8 hours PRN  buPROPion XL (24-Hour) . 150 milliGRAM(s) Oral daily  ceFAZolin  Injectable. 2000 milliGRAM(s) IV Push every 8 hours  dexAMETHasone     Tablet   Oral   dexAMETHasone     Tablet 4 milliGRAM(s) Oral every 6 hours  levothyroxine 50 MICROGram(s) Oral daily  methocarbamol 500 milliGRAM(s) Oral every 8 hours  ondansetron   Disintegrating Tablet 4 milliGRAM(s) Oral every 6 hours  oxycodone    5 mG/acetaminophen 325 mG 1 Tablet(s) Oral every 4 hours PRN  polyethylene glycol 3350 17 Gram(s) Oral daily  povidone iodine 5% Nasal Swab 1 Application(s) Both Nostrils once  pregabalin 50 milliGRAM(s) Oral every 8 hours  senna 1 Tablet(s) Oral daily  sodium chloride 0.9%. 1000 milliLiter(s) IV Continuous <Continuous>      RADIOLOGY:     Exam:  General: patient seen laying supine in bed in NAD  Neuro: AAOx3, FC, OE spontaneously, speech clear and fluent,  strength 5/5 b/l UE and LE, sensation intact to light touch throughout  HEENT: PERRL  Neck: supple  Cardiac: RRR, S1S2  Pulmonary: chest rise symmetric  Abdomen: soft, nontender, nondistended  Ext: warm, perfusing well  Wound: lumbar dressing c/d/i            Assessment: 40yMale ssessment:  41 y/o male presenting with severe RLE radicular pain found to have S1 nerve root compression by L5-S1 disc herniation, now planned for OR today for L5-S1 hemilaminotomy and microdiscectomy.       Plan:      Assessment:  Present when checked    []  GCS  E   V  M     Heart Failure: []Acute, [] acute on chronic , []chronic  Heart Failure:  [] Diastolic (HFpEF), [] Systolic (HFrEF), []Combined (HFpEF and HFrEF), [] RHF, [] Pulm HTN, [] Other    [] ZARINA, [] ATN, [] AIN, [] other  [] CKD1, [] CKD2, [] CKD 3, [] CKD 4, [] CKD 5, []ESRD    Encephalopathy: [] Metabolic, [] Hepatic, [] toxic, [] Neurological, [] Other    Abnormal Nurtitional Status: [] malnurtition (see nutrition note), [ ]underweight: BMI < 19, [] morbid obesity: BMI >40, [] Cachexia    [] Sepsis  [] hypovolemic shock,[] cardiogenic shock, [] hemorrhagic shock, [] neuogenic shock  [] Acute Respiratory Failure  []Cerebral edema, [] Brain compression/ herniation,   [] Functional quadriplegia  [] Acute blood loss anemia       NEUROSURGERY POST OP NOTE:    POD# 0 S/P Right L5-S1 hemilaminectomy, discectomy. Large herniated fragment and large tear in the annulus        S: Surgery completed without complications. Patient tolerated well. Postop patient reports that pain is well controlled. Patient denies current numbness, tingling, or weakness in extremities.       T(C): 36.6 (07-07-21 @ 00:37), Max: 37.1 (07-06-21 @ 09:02)  HR: 83 (07-07-21 @ 00:37) (66 - 92)  BP: 115/67 (07-07-21 @ 00:37) (101/62 - 132/84)  RR: 16 (07-07-21 @ 00:37) (10 - 20)  SpO2: 98% (07-07-21 @ 00:37) (98% - 100%)      07-06-21 @ 07:01  -  07-07-21 @ 01:08  --------------------------------------------------------  IN: 370 mL / OUT: 0 mL / NET: 370 mL        acetaminophen   Tablet .. 1000 milliGRAM(s) Oral every 8 hours  benzocaine 15 mG/menthol 3.6 mG Lozenge 1 Lozenge Oral every 8 hours PRN  buPROPion XL (24-Hour) . 150 milliGRAM(s) Oral daily  ceFAZolin  Injectable. 2000 milliGRAM(s) IV Push every 8 hours  dexAMETHasone     Tablet   Oral   dexAMETHasone     Tablet 4 milliGRAM(s) Oral every 6 hours  levothyroxine 50 MICROGram(s) Oral daily  methocarbamol 500 milliGRAM(s) Oral every 8 hours  ondansetron   Disintegrating Tablet 4 milliGRAM(s) Oral every 6 hours  oxycodone    5 mG/acetaminophen 325 mG 1 Tablet(s) Oral every 4 hours PRN  polyethylene glycol 3350 17 Gram(s) Oral daily  povidone iodine 5% Nasal Swab 1 Application(s) Both Nostrils once  pregabalin 50 milliGRAM(s) Oral every 8 hours  senna 1 Tablet(s) Oral daily  sodium chloride 0.9%. 1000 milliLiter(s) IV Continuous <Continuous>      RADIOLOGY:     Exam:  General: patient seen laying supine in bed in NAD  Neuro: AAOx3, FC, OE spontaneously, speech clear and fluent,  strength 5/5 b/l UE and LE, sensation intact to light touch throughout  HEENT: PERRL  Neck: supple  Cardiac: RRR, S1S2  Pulmonary: chest rise symmetric  Abdomen: soft, nontender, nondistended  Ext: warm, perfusing well  Wound: lumbar dressing c/d/i            Assessment:   39 y/o male with pmh hypothyroidism, depression and migraines presenting with severe RLE radicular pain found to have S1 nerve root compression by L5-S1 disc herniation, now s/p L5-S1 hemilaminectomy and discectomy (7/6/21).      Plan:  Neuro:  - neuro/vital checks  - no imaging needed  - PT/OT  - pain control  - decadron taper x 5 days    Cardiac:  - normotensive BP goal    Pulmonary:  - on room air    GI:  -  regular diet  - protonix while on decadron for GI prophylaxis  - bowel regimen    Renal:  - replete electrolytes prn    Heme:  - f/u AM Hb    Endo:  - ISS    ID:  - perioperative Ancef    D/w Dr. Schulz        Assessment:  Present when checked    []  GCS  E   V  M     Heart Failure: []Acute, [] acute on chronic , []chronic  Heart Failure:  [] Diastolic (HFpEF), [] Systolic (HFrEF), []Combined (HFpEF and HFrEF), [] RHF, [] Pulm HTN, [] Other    [] ZARINA, [] ATN, [] AIN, [] other  [] CKD1, [] CKD2, [] CKD 3, [] CKD 4, [] CKD 5, []ESRD    Encephalopathy: [] Metabolic, [] Hepatic, [] toxic, [] Neurological, [] Other    Abnormal Nurtitional Status: [] malnurtition (see nutrition note), [ ]underweight: BMI < 19, [] morbid obesity: BMI >40, [] Cachexia    [] Sepsis  [] hypovolemic shock,[] cardiogenic shock, [] hemorrhagic shock, [] neuogenic shock  [] Acute Respiratory Failure  []Cerebral edema, [] Brain compression/ herniation,   [] Functional quadriplegia  [] Acute blood loss anemia

## 2021-07-07 NOTE — DISCHARGE NOTE PROVIDER - HOSPITAL COURSE
HPI:  Pt is a 41yo M w/ pmhx of hypothyroidism (on 50mcg daily levothyroxine), depression (on buproprion 300mg qd), migraines (sumatriptan prn) presenting with 2 days of sharp constant 9/10 R gluteal pain worse w/ ambulation, improved w rest associated with intermittent numbness on the R lateral thigh.  Patient states hes been seeing a chiropractor and physical therapist for general health maintenance was moving furniture days prior, no trauma/fall/instrumentation. Motor and sensory remain intact. pt denies urinary or GI sx, no paresthesia to groin, no weakness to LE. no prior back or spinal surgeries.     Hospital Course:  7/4: admitted for R sided sciatica.   o/n: Given sumotriptan subcutaneous 6mg for migraine ( home dose is 50mg-100mg PO), Added Flexiril 5 for sciatica symptoms   7/5: CT lumbar and pelvis shows L4-S1 canal stenosis and foramenal narrowin, MRI showing s1 nerve root compression by L5-S1 disc herniation.  O/n: Endorsed numbness and weakness with defecation at 9:00 pm. Anal sphincter toine in tact and neuro exam stable. NSGY planning for OR Wednesday (7/7). Patient aware.   7/6: POD0 s/p L5-S1 R hemilaminectomy and discectomy  7/7: POD1, ANAIS overnight, pain controlled    Patient evaluated by PT/OT who recommended: Home ____  Patient is going home    Hospital course uncomplicated    Exam on day of discharge:  General: patient seen laying supine in bed in NAD  Neuro: AAOx3, FC, OE spontaneously, speech clear and fluent,  strength 5/5 b/l UE and LE, sensation intact to light touch throughout  HEENT: PERRL  Neck: supple  Cardiac: RRR, S1S2  Pulmonary: chest rise symmetric  Abdomen: soft, nontender, nondistended  Ext: warm, perfusing well  Wound: lumbar dressing c/d/i    Checklist:   - f/u Dr. Schulz   - Neurologically stable for discharge  - Vitals stable for discharge   - Afebrile for discharge  - WBC is stable (on steroids)  - Sodium level is normal  - Pain is adequately controlled  - No brace needed       HPI:  Pt is a 39yo M w/ pmhx of hypothyroidism (on 50mcg daily levothyroxine), depression (on buproprion 300mg qd), migraines (sumatriptan prn) presenting with 2 days of sharp constant 9/10 R gluteal pain worse w/ ambulation, improved w rest associated with intermittent numbness on the R lateral thigh.  Patient states hes been seeing a chiropractor and physical therapist for general health maintenance was moving furniture days prior, no trauma/fall/instrumentation. Motor and sensory remain intact. pt denies urinary or GI sx, no paresthesia to groin, no weakness to LE. no prior back or spinal surgeries.     Hospital Course:  7/4: admitted for R sided sciatica.   o/n: Given sumotriptan subcutaneous 6mg for migraine ( home dose is 50mg-100mg PO), Added Flexiril 5 for sciatica symptoms   7/5: CT lumbar and pelvis shows L4-S1 canal stenosis and foramenal narrowin, MRI showing s1 nerve root compression by L5-S1 disc herniation.  O/n: Endorsed numbness and weakness with defecation at 9:00 pm. Anal sphincter toine in tact and neuro exam stable. NSGY planning for OR Wednesday (7/7). Patient aware.   7/6: POD0 s/p L5-S1 R hemilaminectomy and discectomy  7/7: POD1, ANAIS overnight, pain controlled    Patient evaluated by PT/OT who recommended: Home with home PT/OT   Patient is going home    Hospital course uncomplicated    Exam on day of discharge:  General: patient seen laying supine in bed in NAD  Neuro: AAOx3, FC, OE spontaneously, speech clear and fluent,  strength 5/5 b/l UE and LE, sensation intact to light touch throughout  HEENT: PERRL  Neck: supple  Cardiac: RRR, S1S2  Pulmonary: chest rise symmetric  Abdomen: soft, nontender, nondistended  Ext: warm, perfusing well  Wound: lumbar dressing c/d/i    Checklist:   - f/u Dr. Schulz   - Neurologically stable for discharge  - Vitals stable for discharge   - Afebrile for discharge  - WBC is stable (on steroids)  - Sodium level is normal  - Pain is adequately controlled  - No brace needed       HPI:  Pt is a 39yo M w/ pmhx of hypothyroidism (on 50mcg daily levothyroxine), depression (on buproprion 300mg qd), migraines (sumatriptan prn) presenting with 2 days of sharp constant 9/10 R gluteal pain worse w/ ambulation, improved w rest associated with intermittent numbness on the R lateral thigh.  Patient states hes been seeing a chiropractor and physical therapist for general health maintenance was moving furniture days prior, no trauma/fall/instrumentation. Motor and sensory remain intact. pt denies urinary or GI sx, no paresthesia to groin, no weakness to LE. no prior back or spinal surgeries.     Hospital Course:  7/4: admitted for R sided sciatica.   o/n: Given sumotriptan subcutaneous 6mg for migraine ( home dose is 50mg-100mg PO), Added Flexiril 5 for sciatica symptoms   7/5: CT lumbar and pelvis shows L4-S1 canal stenosis and foramenal narrowin, MRI showing s1 nerve root compression by L5-S1 disc herniation.  O/n: Endorsed numbness and weakness with defecation at 9:00 pm. Anal sphincter toine in tact and neuro exam stable. NSGY planning for OR Wednesday (7/7). Patient aware.   7/6: POD0 s/p L5-S1 R hemilaminectomy and discectomy  7/7: POD1, ANAIS overnight, pain controlled  7/8: POD2, cleared for DC home    Patient evaluated by PT/OT who recommended: Home with home PT/OT   Patient is going home    Hospital course uncomplicated    Exam on day of discharge:  General: patient seen laying supine in bed in NAD  Neuro: AAOx3, FC, OE spontaneously, speech clear and fluent,  strength 5/5 b/l UE and LE, sensation intact to light touch throughout  HEENT: PERRL  Neck: supple  Cardiac: RRR, S1S2  Pulmonary: chest rise symmetric  Abdomen: soft, nontender, nondistended  Ext: warm, perfusing well  Wound: lumbar dressing c/d/i    Checklist:   - f/u Dr. Schulz   - Neurologically stable for discharge  - Vitals stable for discharge   - Afebrile for discharge  - WBC is stable (on steroids)  - Sodium level is normal  - Pain is adequately controlled  - No brace needed

## 2021-07-07 NOTE — OCCUPATIONAL THERAPY INITIAL EVALUATION ADULT - LIVES WITH, PROFILE
Pt reports living with wife in elevator apt with no KENNEDI. At baseline Pt is independent with all ADL's and did not use any AE to ambulate. Pt reported feeling sharp pain a few days prior to surgery requiring the use of walker to ambulate./spouse

## 2021-07-07 NOTE — PROGRESS NOTE ADULT - SUBJECTIVE AND OBJECTIVE BOX
41 y/o male S/P Right L5-S1 hemilaminectomy, discectomy. Large herniated fragment and large tear in the annulus        S: Surgery completed without complications. Patient tolerated well. Postop patient reports that pain is well controlled. Patient denies current numbness, tingling, or weakness in extremities.       Home Medications:  buPROPion 300 mg/24 hours (XL) oral tablet, extended release: 1 tab(s) orally every 24 hours (04 Jul 2021 18:01)  levothyroxine 50 mcg (0.05 mg) oral tablet: 1 tab(s) orally once a day (04 Jul 2021 18:01)  SUMAtriptan 25 mg oral tablet: 1 tab(s) orally once, As Needed (04 Jul 2021 18:01)    Vital Signs Last 24 Hrs  T(C): 36.5 (07 Jul 2021 04:30), Max: 37.1 (06 Jul 2021 09:02)  T(F): 97.7 (07 Jul 2021 04:30), Max: 98.8 (06 Jul 2021 09:02)  HR: 70 (07 Jul 2021 04:30) (66 - 92)  BP: 110/61 (07 Jul 2021 04:30) (101/62 - 132/84)  BP(mean): 97 (06 Jul 2021 21:45) (93 - 104)  RR: 16 (07 Jul 2021 04:30) (10 - 20)  SpO2: 100% (07 Jul 2021 04:30) (98% - 100%)      MEDICATIONS  (STANDING):  acetaminophen   Tablet .. 1000 milliGRAM(s) Oral every 8 hours  buPROPion XL (24-Hour) . 150 milliGRAM(s) Oral daily  ceFAZolin  Injectable. 2000 milliGRAM(s) IV Push every 8 hours  dexAMETHasone     Tablet   Oral   dexAMETHasone     Tablet 4 milliGRAM(s) Oral every 6 hours  dextrose 40% Gel 15 Gram(s) Oral once  dextrose 5%. 1000 milliLiter(s) (50 mL/Hr) IV Continuous <Continuous>  dextrose 5%. 1000 milliLiter(s) (100 mL/Hr) IV Continuous <Continuous>  dextrose 50% Injectable 25 Gram(s) IV Push once  dextrose 50% Injectable 12.5 Gram(s) IV Push once  dextrose 50% Injectable 25 Gram(s) IV Push once  glucagon  Injectable 1 milliGRAM(s) IntraMuscular once  insulin lispro (ADMELOG) corrective regimen sliding scale   SubCutaneous Before meals and at bedtime  levothyroxine 50 MICROGram(s) Oral daily  methocarbamol 500 milliGRAM(s) Oral every 8 hours  ondansetron   Disintegrating Tablet 4 milliGRAM(s) Oral every 6 hours  pantoprazole    Tablet 40 milliGRAM(s) Oral before breakfast  polyethylene glycol 3350 17 Gram(s) Oral daily  povidone iodine 5% Nasal Swab 1 Application(s) Both Nostrils once  pregabalin 50 milliGRAM(s) Oral every 8 hours  senna 2 Tablet(s) Oral at bedtime    MEDICATIONS  (PRN):  benzocaine 15 mG/menthol 3.6 mG Lozenge 1 Lozenge Oral every 8 hours PRN Sore Throat  oxycodone    5 mG/acetaminophen 325 mG 1 Tablet(s) Oral every 4 hours PRN Severe Pain (7 - 10)    sodium chloride 0.9%. 1000 milliLiter(s) IV Continuous <Continuous>      RADIOLOGY:     Exam:  General: patient seen laying supine in bed in NAD  Neuro: AAOx3, FC, OE spontaneously, speech clear and fluent,  strength 5/5 b/l UE and LE, sensation intact to light touch throughout  HEENT: PERRL  Neck: supple  Cardiac: RRR, S1S2  Pulmonary: chest rise symmetric  Abdomen: soft, nontender, nondistended  Ext: warm, perfusing well  Wound: lumbar dressing c/d/i          Assessment:   41 y/o male with pmh hypothyroidism, depression and migraines presenting with severe RLE radicular pain found to have S1 nerve root compression by L5-S1 disc herniation, now s/p L5-S1 hemilaminectomy and discectomy (7/6/21).  - Continue Acetaminophen 1000 mg Po q8  - Continue Dexamethasone  - Continue Methocarbamol 500 mg Po q8h  - Continue Pregabalin 50 mg Po q8h  - Continue Oxycodone 5 mg Po q4h prn pain.  Encouraged patient to make use of this medication to help with post surgical pain limiting ambulation.  - Will continue to follow  - Continue bowel regimen

## 2021-07-07 NOTE — PROGRESS NOTE ADULT - PROVIDER SPECIALTY LIST ADULT
Neurosurgery
Neurosurgery
Internal Medicine
Pain Medicine
Neurosurgery
Neurosurgery
Internal Medicine
Hospitalist

## 2021-07-07 NOTE — OCCUPATIONAL THERAPY INITIAL EVALUATION ADULT - PLANNED THERAPY INTERVENTIONS, OT EVAL
ADL retraining/balance training/bed mobility training/parent/caregiver training.../transfer training

## 2021-07-07 NOTE — OCCUPATIONAL THERAPY INITIAL EVALUATION ADULT - PERTINENT HX OF CURRENT PROBLEM, REHAB EVAL
pt is a 39yo M w/ pmhx of hypothyroidism (on 50mcg daily levothyroxine), depression (on buproprion 300mg qd), migraines (sumatriptan prn) presenting with 2 days of sharp constant 9/10 R gluteal pain worse w/ ambulation, improved w rest associated with intermittent numbness on the R lateral thigh.. Pt is S/P Right L5-S1 hemilaminectomy, discectomy. Large herniated fragment and large tear in the annulus Pt is a 41yo M w/ pmhx of hypothyroidism (on 50mcg daily levothyroxine), depression (on buproprion 300mg qd), migraines (sumatriptan prn) presenting with 2 days of sharp constant 9/10 R gluteal pain worse w/ ambulation, improved w rest associated with intermittent numbness on the R lateral thigh.. Pt is S/P Right L5-S1 hemilaminectomy, discectomy. Large herniated fragment and large tear in the annulus

## 2021-07-07 NOTE — OCCUPATIONAL THERAPY INITIAL EVALUATION ADULT - DIAGNOSIS, OT EVAL
Pt presents with increased pain, spinal precautions, decreased strength in BLE, decreased activity tolerance and decreased standing balance impacting safety and independence with ADL's and Fx mobility.

## 2021-07-07 NOTE — DISCHARGE NOTE PROVIDER - NSDCCPCAREPLAN_GEN_ALL_CORE_FT
PRINCIPAL DISCHARGE DIAGNOSIS  Diagnosis: Lumbar disc herniation with radiculopathy  Assessment and Plan of Treatment: Lumbar disc herniation with radiculopathy      SECONDARY DISCHARGE DIAGNOSES  Diagnosis: Sciatica of right side  Assessment and Plan of Treatment:     Diagnosis: Lumbar disc herniation with radiculopathy  Assessment and Plan of Treatment: Lumbar disc herniation with radiculopathy     PRINCIPAL DISCHARGE DIAGNOSIS  Diagnosis: Lumbar disc herniation with radiculopathy  Assessment and Plan of Treatment: Lumbar disc herniation with radiculopathy      SECONDARY DISCHARGE DIAGNOSES  Diagnosis: Lumbar disc herniation with radiculopathy  Assessment and Plan of Treatment: Lumbar disc herniation with radiculopathy    Diagnosis: Leg pain  Assessment and Plan of Treatment: Leg pain    Diagnosis: Hypothyroidism  Assessment and Plan of Treatment: Hypothyroidism    Diagnosis: Migraine  Assessment and Plan of Treatment: Migraine    Diagnosis: Depression  Assessment and Plan of Treatment: Depression    Diagnosis: Sciatica of right side  Assessment and Plan of Treatment:

## 2021-07-07 NOTE — DISCHARGE NOTE PROVIDER - NSDCCPTREATMENT_GEN_ALL_CORE_FT
PRINCIPAL PROCEDURE  Procedure: Discectomy at L5-S1 level by posterior approach  Findings and Treatment:

## 2021-07-07 NOTE — DISCHARGE NOTE PROVIDER - NSDCFUADDINST_GEN_ALL_CORE_FT
Wound Care:  1) You should wash your surgical incision site 24 hours after you get home, unless told not  to by your doctor.  2) Massage your incision site gently to remove any dried or crusted blood. Pat the wound  dry with a clean towel afterwards and leave the wound open to air. Do not apply creams  or ointments to the wound.  3) You should shower everyday as this will keep your wound clean and promote healing.  4) No tub baths or swimming for 2 weeks after your surgery.  5) Mild incision site swelling is common and should decrease as the days go by. If you  notice any wound drainage, redness, increasing swelling, or fever greater than 101F,  please notify your neurosurgeon immediately or go to the nearest emergency room.  6) Do not wear tight pants or clothing that can irritate your wound.    Activity Level:  1) Fatigue is common following spine surgery. Listen to your body and rest if you feel tired.  2) You should get up and walk around during the day time.  3) No bending, lifting, twisting or strenuous housework for the first 3-6 weeks after surgery  4) Hydrate yourself. Drink plenty of water.  5) If you notice any new weakness, tingling, or numbness of your extremities, changes in  speech or mental status, headaches, please notify your doctor immediately or go to the  nearest emergency room.    Pain Management:  1) You may be given a short course (5-7 days) of narcotic pain relievers such as percocet  or oxycodone to be taken as needed for moderate to severe pain. You may also be  given a muscle relaxant (e.g. valium, robaxin.)  2) These medications may cause drowsiness, nausea. Take after food and drink plenty of  water/high fiber foods. Do not drive or operate machinery while taking narcotics.  3) You should also take a stool softener (senna, colace) as narcotics may cause  constipation. These can be bought over-the-counter, without a prescription.  4) No not drink alcohol with these medications.  5) Ask your surgeon before taking nonsteroidal anti-inflammatory drugs (NSAIDs) such as  advil, ibuprofen, motrin, naproxen. NSAIDs may cause bleeding.    Other Medications Upon Discharge:  1) You may be prescribed a steroid (e.g. decadron, medrol dosepak) after spine surgery to  reduce surgical site swelling. Decadron (dexamethasone) may cause an increase in  appetite, irritability, difficulty sleeping, hiccups, increased blood sugars and increased  blood pressure. Please take as instructed by your doctor.  2) You should take an antacid (such as tums, pepcid, nexium) while on decadron as  steroids may irritate the gastrointestinal lining.  3) Resume your home medications such as those for high blood pressure, diabetes mellitus  etc, unless you are told not to by your doctor.  4) If you are taking aspirin or any blood-thinners, ask your doctor if it is safe to resume.    Follow-up Appointment:  1) Please call our office, [phone], to confirm your follow-up appointment.  2) If you have staples or sutures, those are normally removed 10-14 days after surgery,  during your follow-up with your doctor.  3) We recommend scheduling a follow-up appointment postoperatively with your primary care doctor.  4) Your ongoing care plan will be discussed with your neurosurgeon during your follow-up.

## 2021-07-07 NOTE — DISCHARGE NOTE PROVIDER - NSDCMRMEDTOKEN_GEN_ALL_CORE_FT
buPROPion 300 mg/24 hours (XL) oral tablet, extended release: 1 tab(s) orally every 24 hours  levothyroxine 50 mcg (0.05 mg) oral tablet: 1 tab(s) orally once a day  SUMAtriptan 25 mg oral tablet: 1 tab(s) orally once, As Needed   acetaminophen 500 mg oral tablet: 2 tab(s) orally every 8 hours, As Needed -for mild pain   buPROPion 300 mg/24 hours (XL) oral tablet, extended release: 1 tab(s) orally every 24 hours  levothyroxine 50 mcg (0.05 mg) oral tablet: 1 tab(s) orally once a day  methocarbamol 500 mg oral tablet: 1 tab(s) orally every 8 hours, As Needed -for muscle spasm MDD:do not take more than 3 tabs per day  MethylPREDNISolone Dose Pack 4 mg oral tablet: 4 milligram(s) orally 4 times a day     TAKE DOSEPAK AS INSTRUCTED  oxycodone-acetaminophen 5 mg-325 mg oral tablet: 1 tab(s) by jejunostomy tube every 4 hours, As Needed -Severe Pain (7 - 10) MDD:do not take more than 6 tabs per day. each tab contains 325mg tylenol - do not take more than total 4000mg tylenol per day   pantoprazole 40 mg oral delayed release tablet: 1 tab(s) orally once a day (before a meal)  polyethylene glycol 3350 oral powder for reconstitution: 17 gram(s) orally once a day  pregabalin 50 mg oral capsule: 1 cap(s) orally every 8 hours MDD:no more than 3 tabs/day  senna oral tablet: 2 tab(s) orally once a day (at bedtime), As Needed - for constipation   SUMAtriptan 25 mg oral tablet: 1 tab(s) orally once, As Needed

## 2021-07-07 NOTE — DISCHARGE NOTE PROVIDER - CARE PROVIDER_API CALL
Brad Schulz)  Neurosurgery  [address] [place] [address], [place] [address]  Phone: [phone]  Fax: [phone]  Follow Up Time:

## 2021-07-08 VITALS
DIASTOLIC BLOOD PRESSURE: 78 MMHG | HEART RATE: 94 BPM | TEMPERATURE: 98 F | SYSTOLIC BLOOD PRESSURE: 114 MMHG | OXYGEN SATURATION: 98 % | RESPIRATION RATE: 17 BRPM

## 2021-07-08 LAB — GLUCOSE BLDC GLUCOMTR-MCNC: 108 MG/DL — HIGH (ref 70–99)

## 2021-07-08 PROCEDURE — 82962 GLUCOSE BLOOD TEST: CPT

## 2021-07-08 PROCEDURE — 76000 FLUOROSCOPY <1 HR PHYS/QHP: CPT

## 2021-07-08 PROCEDURE — 86850 RBC ANTIBODY SCREEN: CPT

## 2021-07-08 PROCEDURE — 83735 ASSAY OF MAGNESIUM: CPT

## 2021-07-08 PROCEDURE — 84100 ASSAY OF PHOSPHORUS: CPT

## 2021-07-08 PROCEDURE — 72158 MRI LUMBAR SPINE W/O & W/DYE: CPT

## 2021-07-08 PROCEDURE — 85025 COMPLETE CBC W/AUTO DIFF WBC: CPT

## 2021-07-08 PROCEDURE — 86769 SARS-COV-2 COVID-19 ANTIBODY: CPT

## 2021-07-08 PROCEDURE — 72192 CT PELVIS W/O DYE: CPT

## 2021-07-08 PROCEDURE — 83036 HEMOGLOBIN GLYCOSYLATED A1C: CPT

## 2021-07-08 PROCEDURE — 80048 BASIC METABOLIC PNL TOTAL CA: CPT

## 2021-07-08 PROCEDURE — 87635 SARS-COV-2 COVID-19 AMP PRB: CPT

## 2021-07-08 PROCEDURE — 85730 THROMBOPLASTIN TIME PARTIAL: CPT

## 2021-07-08 PROCEDURE — 96376 TX/PRO/DX INJ SAME DRUG ADON: CPT

## 2021-07-08 PROCEDURE — 93005 ELECTROCARDIOGRAM TRACING: CPT

## 2021-07-08 PROCEDURE — 71045 X-RAY EXAM CHEST 1 VIEW: CPT

## 2021-07-08 PROCEDURE — 85610 PROTHROMBIN TIME: CPT

## 2021-07-08 PROCEDURE — 80053 COMPREHEN METABOLIC PANEL: CPT

## 2021-07-08 PROCEDURE — A9585: CPT

## 2021-07-08 PROCEDURE — 84443 ASSAY THYROID STIM HORMONE: CPT

## 2021-07-08 PROCEDURE — 86901 BLOOD TYPING SEROLOGIC RH(D): CPT

## 2021-07-08 PROCEDURE — 36415 COLL VENOUS BLD VENIPUNCTURE: CPT

## 2021-07-08 PROCEDURE — 96375 TX/PRO/DX INJ NEW DRUG ADDON: CPT

## 2021-07-08 PROCEDURE — C1889: CPT

## 2021-07-08 PROCEDURE — 86900 BLOOD TYPING SEROLOGIC ABO: CPT

## 2021-07-08 PROCEDURE — 96374 THER/PROPH/DIAG INJ IV PUSH: CPT

## 2021-07-08 PROCEDURE — 87389 HIV-1 AG W/HIV-1&-2 AB AG IA: CPT

## 2021-07-08 PROCEDURE — 97164 PT RE-EVAL EST PLAN CARE: CPT

## 2021-07-08 PROCEDURE — 97161 PT EVAL LOW COMPLEX 20 MIN: CPT

## 2021-07-08 PROCEDURE — 84481 FREE ASSAY (FT-3): CPT

## 2021-07-08 PROCEDURE — 72131 CT LUMBAR SPINE W/O DYE: CPT

## 2021-07-08 PROCEDURE — 99285 EMERGENCY DEPT VISIT HI MDM: CPT | Mod: 25

## 2021-07-08 PROCEDURE — 97530 THERAPEUTIC ACTIVITIES: CPT

## 2021-07-08 RX ORDER — POLYETHYLENE GLYCOL 3350 17 G/17G
17 POWDER, FOR SOLUTION ORAL
Qty: 0 | Refills: 0 | DISCHARGE
Start: 2021-07-08

## 2021-07-08 RX ORDER — METHOCARBAMOL 500 MG/1
1 TABLET, FILM COATED ORAL
Qty: 15 | Refills: 0
Start: 2021-07-08 | End: 2021-07-12

## 2021-07-08 RX ORDER — ACETAMINOPHEN 500 MG
2 TABLET ORAL
Qty: 30 | Refills: 0
Start: 2021-07-08 | End: 2021-07-12

## 2021-07-08 RX ORDER — SENNA PLUS 8.6 MG/1
2 TABLET ORAL
Qty: 10 | Refills: 0
Start: 2021-07-08 | End: 2021-07-12

## 2021-07-08 RX ORDER — PANTOPRAZOLE SODIUM 20 MG/1
1 TABLET, DELAYED RELEASE ORAL
Qty: 5 | Refills: 0
Start: 2021-07-08 | End: 2021-07-12

## 2021-07-08 RX ORDER — DEXAMETHASONE 0.5 MG/5ML
2 ELIXIR ORAL
Qty: 10 | Refills: 0
Start: 2021-07-08

## 2021-07-08 RX ADMIN — Medication 2 MILLIGRAM(S): at 11:59

## 2021-07-08 RX ADMIN — Medication 50 MILLIGRAM(S): at 05:44

## 2021-07-08 RX ADMIN — POLYETHYLENE GLYCOL 3350 17 GRAM(S): 17 POWDER, FOR SOLUTION ORAL at 05:23

## 2021-07-08 RX ADMIN — PANTOPRAZOLE SODIUM 40 MILLIGRAM(S): 20 TABLET, DELAYED RELEASE ORAL at 05:44

## 2021-07-08 RX ADMIN — ONDANSETRON 4 MILLIGRAM(S): 8 TABLET, FILM COATED ORAL at 05:44

## 2021-07-08 RX ADMIN — Medication 2 MILLIGRAM(S): at 05:45

## 2021-07-08 RX ADMIN — Medication 50 MICROGRAM(S): at 05:03

## 2021-07-08 RX ADMIN — METHOCARBAMOL 500 MILLIGRAM(S): 500 TABLET, FILM COATED ORAL at 05:44

## 2021-07-08 RX ADMIN — Medication 1000 MILLIGRAM(S): at 05:44

## 2021-07-08 RX ADMIN — BUPROPION HYDROCHLORIDE 150 MILLIGRAM(S): 150 TABLET, EXTENDED RELEASE ORAL at 11:58

## 2021-07-08 RX ADMIN — Medication 1000 MILLIGRAM(S): at 06:44

## 2021-07-08 NOTE — DISCHARGE NOTE NURSING/CASE MANAGEMENT/SOCIAL WORK - PATIENT PORTAL LINK FT
You can access the FollowMyHealth Patient Portal offered by [facility] by registering at the following website: [email or web address]. By joining [employer] [facility] [employer]’s FollowMyHealth portal, you will also be able to view your health information using other applications (apps) compatible with our system.

## 2021-07-08 NOTE — DISCHARGE NOTE NURSING/CASE MANAGEMENT/SOCIAL WORK - NSDCFUADDAPPT_GEN_ALL_CORE_FT
Please call Dr. Schulz's office at [phone] to schedule a follow up appointment.     Please also schedule a follow up appointment with your primary care provider

## 2021-07-22 DIAGNOSIS — E03.9 HYPOTHYROIDISM, UNSPECIFIED: ICD-10-CM

## 2021-07-22 DIAGNOSIS — M51.17 INTERVERTEBRAL DISC DISORDERS WITH RADICULOPATHY, LUMBOSACRAL REGION: ICD-10-CM

## 2021-07-22 DIAGNOSIS — G43.909 MIGRAINE, UNSPECIFIED, NOT INTRACTABLE, WITHOUT STATUS MIGRAINOSUS: ICD-10-CM

## 2021-07-22 DIAGNOSIS — M54.5 LOW BACK PAIN: ICD-10-CM

## 2021-07-22 DIAGNOSIS — F32.9 MAJOR DEPRESSIVE DISORDER, SINGLE EPISODE, UNSPECIFIED: ICD-10-CM

## 2021-08-24 NOTE — PHYSICAL THERAPY INITIAL EVALUATION ADULT - ADDITIONAL COMMENTS
none Patient lives with wife and children in elevator access building, prior to this hospitalization patient was fully independent without assistive device

## 2024-02-02 NOTE — ED PROVIDER NOTE - CARE PLAN
Addended by: ELZA WEBSTER on: 2/2/2024 05:17 PM     Modules accepted: Orders     Principal Discharge DX:	Sciatica of right side  Secondary Diagnosis:	Unable to walk